# Patient Record
Sex: FEMALE | Race: WHITE | NOT HISPANIC OR LATINO | URBAN - METROPOLITAN AREA
[De-identification: names, ages, dates, MRNs, and addresses within clinical notes are randomized per-mention and may not be internally consistent; named-entity substitution may affect disease eponyms.]

---

## 2017-04-03 ENCOUNTER — APPOINTMENT (RX ONLY)
Dept: URBAN - METROPOLITAN AREA CLINIC 349 | Facility: CLINIC | Age: 77
Setting detail: DERMATOLOGY
End: 2017-04-03

## 2017-04-03 DIAGNOSIS — L81.4 OTHER MELANIN HYPERPIGMENTATION: ICD-10-CM

## 2017-04-03 DIAGNOSIS — D18.0 HEMANGIOMA: ICD-10-CM

## 2017-04-03 DIAGNOSIS — Z85.820 PERSONAL HISTORY OF MALIGNANT MELANOMA OF SKIN: ICD-10-CM

## 2017-04-03 DIAGNOSIS — L57.8 OTHER SKIN CHANGES DUE TO CHRONIC EXPOSURE TO NONIONIZING RADIATION: ICD-10-CM

## 2017-04-03 DIAGNOSIS — L21.8 OTHER SEBORRHEIC DERMATITIS: ICD-10-CM

## 2017-04-03 PROBLEM — M12.9 ARTHROPATHY, UNSPECIFIED: Status: ACTIVE | Noted: 2017-04-03

## 2017-04-03 PROBLEM — I10 ESSENTIAL (PRIMARY) HYPERTENSION: Status: ACTIVE | Noted: 2017-04-03

## 2017-04-03 PROBLEM — D18.01 HEMANGIOMA OF SKIN AND SUBCUTANEOUS TISSUE: Status: ACTIVE | Noted: 2017-04-03

## 2017-04-03 PROCEDURE — 99214 OFFICE O/P EST MOD 30 MIN: CPT

## 2017-04-03 PROCEDURE — ? RECOMMENDATIONS

## 2017-04-03 PROCEDURE — ? COUNSELING

## 2017-04-03 PROCEDURE — ? PRESCRIPTION

## 2017-04-03 RX ORDER — MOMETASONE FUROATE 1 MG/G
CREAM TOPICAL
Qty: 1 | Refills: 2 | Status: ERX | COMMUNITY
Start: 2017-04-03

## 2017-04-03 RX ADMIN — MOMETASONE FUROATE: 1 CREAM TOPICAL at 00:00

## 2017-04-03 ASSESSMENT — LOCATION SIMPLE DESCRIPTION DERM
LOCATION SIMPLE: TRAPEZIAL NECK
LOCATION SIMPLE: LEFT FOREARM
LOCATION SIMPLE: ABDOMEN
LOCATION SIMPLE: LEFT UPPER BACK
LOCATION SIMPLE: LEFT PRETIBIAL REGION
LOCATION SIMPLE: RIGHT PRETIBIAL REGION
LOCATION SIMPLE: LEFT CHEEK
LOCATION SIMPLE: CHEST
LOCATION SIMPLE: LEFT ANTERIOR NECK
LOCATION SIMPLE: RIGHT FOREARM
LOCATION SIMPLE: UPPER BACK

## 2017-04-03 ASSESSMENT — LOCATION DETAILED DESCRIPTION DERM
LOCATION DETAILED: LEFT CENTRAL MALAR CHEEK
LOCATION DETAILED: LEFT PROXIMAL DORSAL FOREARM
LOCATION DETAILED: SUPERIOR THORACIC SPINE
LOCATION DETAILED: LEFT INFERIOR MEDIAL UPPER BACK
LOCATION DETAILED: RIGHT PROXIMAL DORSAL FOREARM
LOCATION DETAILED: RIGHT DISTAL PRETIBIAL REGION
LOCATION DETAILED: LEFT MEDIAL UPPER BACK
LOCATION DETAILED: MID TRAPEZIAL NECK
LOCATION DETAILED: LEFT INFERIOR ANTERIOR NECK
LOCATION DETAILED: RIGHT MEDIAL SUPERIOR CHEST
LOCATION DETAILED: EPIGASTRIC SKIN
LOCATION DETAILED: RIGHT VENTRAL DISTAL FOREARM
LOCATION DETAILED: LEFT VENTRAL DISTAL FOREARM
LOCATION DETAILED: LEFT PROXIMAL PRETIBIAL REGION

## 2017-04-03 ASSESSMENT — LOCATION ZONE DERM
LOCATION ZONE: FACE
LOCATION ZONE: LEG
LOCATION ZONE: TRUNK
LOCATION ZONE: NECK
LOCATION ZONE: ARM

## 2017-04-03 NOTE — PROCEDURE: COUNSELING
Detail Level: Detailed
Quality 137: Melanoma: Continuity Of Care - Recall System: Recall system not utilized, reason not otherwise specified
Quality 224: Stage 0-Iic Melanoma: Overutilization Of Imaging Studies For Only Stage 0-Iic Melanoma: None of the following diagnostic imaging studies ordered: chest X-ray, CT, Ultrasound, MRI, PET, or nuclear medicine scans (ML)
Detail Level: Generalized
Detail Level: Zone

## 2017-10-04 ENCOUNTER — APPOINTMENT (RX ONLY)
Dept: URBAN - METROPOLITAN AREA CLINIC 349 | Facility: CLINIC | Age: 77
Setting detail: DERMATOLOGY
End: 2017-10-04

## 2017-10-04 DIAGNOSIS — L81.4 OTHER MELANIN HYPERPIGMENTATION: ICD-10-CM

## 2017-10-04 DIAGNOSIS — L82.1 OTHER SEBORRHEIC KERATOSIS: ICD-10-CM

## 2017-10-04 DIAGNOSIS — Z85.820 PERSONAL HISTORY OF MALIGNANT MELANOMA OF SKIN: ICD-10-CM

## 2017-10-04 DIAGNOSIS — Z85.828 PERSONAL HISTORY OF OTHER MALIGNANT NEOPLASM OF SKIN: ICD-10-CM

## 2017-10-04 DIAGNOSIS — L57.8 OTHER SKIN CHANGES DUE TO CHRONIC EXPOSURE TO NONIONIZING RADIATION: ICD-10-CM

## 2017-10-04 DIAGNOSIS — L57.0 ACTINIC KERATOSIS: ICD-10-CM

## 2017-10-04 PROBLEM — L23.7 ALLERGIC CONTACT DERMATITIS DUE TO PLANTS, EXCEPT FOOD: Status: ACTIVE | Noted: 2017-10-04

## 2017-10-04 PROBLEM — E05.90 THYROTOXICOSIS, UNSPECIFIED WITHOUT THYROTOXIC CRISIS OR STORM: Status: ACTIVE | Noted: 2017-10-04

## 2017-10-04 PROBLEM — F41.9 ANXIETY DISORDER, UNSPECIFIED: Status: ACTIVE | Noted: 2017-10-04

## 2017-10-04 PROBLEM — M12.9 ARTHROPATHY, UNSPECIFIED: Status: ACTIVE | Noted: 2017-10-04

## 2017-10-04 PROBLEM — L55.1 SUNBURN OF SECOND DEGREE: Status: ACTIVE | Noted: 2017-10-04

## 2017-10-04 PROBLEM — K75.9 INFLAMMATORY LIVER DISEASE, UNSPECIFIED: Status: ACTIVE | Noted: 2017-10-04

## 2017-10-04 PROBLEM — L20.84 INTRINSIC (ALLERGIC) ECZEMA: Status: ACTIVE | Noted: 2017-10-04

## 2017-10-04 PROCEDURE — 99213 OFFICE O/P EST LOW 20 MIN: CPT | Mod: 25

## 2017-10-04 PROCEDURE — ? LIQUID NITROGEN

## 2017-10-04 PROCEDURE — ? COUNSELING

## 2017-10-04 PROCEDURE — 17000 DESTRUCT PREMALG LESION: CPT

## 2017-10-04 ASSESSMENT — LOCATION SIMPLE DESCRIPTION DERM
LOCATION SIMPLE: CHIN
LOCATION SIMPLE: CHEST
LOCATION SIMPLE: LEFT BREAST
LOCATION SIMPLE: LEFT UPPER BACK
LOCATION SIMPLE: RIGHT LOWER BACK
LOCATION SIMPLE: NOSE
LOCATION SIMPLE: LEFT FOREARM
LOCATION SIMPLE: INFERIOR FOREHEAD
LOCATION SIMPLE: RIGHT FOREARM
LOCATION SIMPLE: LEFT HAND
LOCATION SIMPLE: RIGHT HAND
LOCATION SIMPLE: RIGHT SHOULDER
LOCATION SIMPLE: LEFT SHOULDER
LOCATION SIMPLE: LEFT CHEEK
LOCATION SIMPLE: RIGHT CHEEK

## 2017-10-04 ASSESSMENT — LOCATION DETAILED DESCRIPTION DERM
LOCATION DETAILED: LEFT MENTAL CREASE
LOCATION DETAILED: RIGHT CENTRAL MALAR CHEEK
LOCATION DETAILED: RIGHT POSTERIOR SHOULDER
LOCATION DETAILED: RIGHT SUPERIOR MEDIAL MIDBACK
LOCATION DETAILED: LEFT PROXIMAL DORSAL FOREARM
LOCATION DETAILED: LEFT MEDIAL BREAST 10-11:00 REGION
LOCATION DETAILED: RIGHT RADIAL DORSAL HAND
LOCATION DETAILED: INFERIOR MID FOREHEAD
LOCATION DETAILED: RIGHT ANTERIOR SHOULDER
LOCATION DETAILED: LEFT POSTERIOR SHOULDER
LOCATION DETAILED: UPPER STERNUM
LOCATION DETAILED: NASAL DORSUM
LOCATION DETAILED: LEFT INFERIOR CENTRAL MALAR CHEEK
LOCATION DETAILED: LEFT SUPERIOR UPPER BACK
LOCATION DETAILED: LEFT ULNAR DORSAL HAND
LOCATION DETAILED: RIGHT PROXIMAL DORSAL FOREARM

## 2017-10-04 ASSESSMENT — LOCATION ZONE DERM
LOCATION ZONE: HAND
LOCATION ZONE: TRUNK
LOCATION ZONE: ARM
LOCATION ZONE: FACE
LOCATION ZONE: NOSE

## 2017-10-04 ASSESSMENT — PAIN INTENSITY VAS: HOW INTENSE IS YOUR PAIN 0 BEING NO PAIN, 10 BEING THE MOST SEVERE PAIN POSSIBLE?: NO PAIN

## 2017-10-04 NOTE — PROCEDURE: REASSURANCE
Quality 137: Melanoma: Continuity Of Care - Recall System: Recall system not utilized, reason not otherwise specified
Quality 224: Stage 0-Iic Melanoma: Overutilization Of Imaging Studies For Only Stage 0-Iic Melanoma: None of the following diagnostic imaging studies ordered: chest X-ray, CT, Ultrasound, MRI, PET, or nuclear medicine scans (ML)
Detail Level: Zone
Detail Level: Detailed

## 2017-10-04 NOTE — PROCEDURE: LIQUID NITROGEN
Consent: The patient's consent was obtained including but not limited to risks of crusting, scabbing, blistering, scarring, darker or lighter pigmentary change, recurrence, incomplete removal and infection.
Duration Of Freeze Thaw-Cycle (Seconds): 2
Post-Care Instructions: I reviewed with the patient in detail post-care instructions. Patient is to wear sunprotection, and avoid picking at any of the treated lesions. Pt may apply Vaseline to crusted or scabbing areas.
Render Post-Care Instructions In Note?: no
Number Of Freeze-Thaw Cycles: 1 freeze-thaw cycle
Detail Level: Detailed

## 2018-02-07 ENCOUNTER — APPOINTMENT (RX ONLY)
Dept: URBAN - METROPOLITAN AREA CLINIC 349 | Facility: CLINIC | Age: 78
Setting detail: DERMATOLOGY
End: 2018-02-07

## 2018-02-07 DIAGNOSIS — L259 CONTACT DERMATITIS AND OTHER ECZEMA, UNSPECIFIED CAUSE: ICD-10-CM

## 2018-02-07 DIAGNOSIS — L82.1 OTHER SEBORRHEIC KERATOSIS: ICD-10-CM

## 2018-02-07 PROBLEM — K75.9 INFLAMMATORY LIVER DISEASE, UNSPECIFIED: Status: ACTIVE | Noted: 2018-02-07

## 2018-02-07 PROBLEM — I10 ESSENTIAL (PRIMARY) HYPERTENSION: Status: ACTIVE | Noted: 2018-02-07

## 2018-02-07 PROBLEM — L23.7 ALLERGIC CONTACT DERMATITIS DUE TO PLANTS, EXCEPT FOOD: Status: ACTIVE | Noted: 2018-02-07

## 2018-02-07 PROBLEM — L23.9 ALLERGIC CONTACT DERMATITIS, UNSPECIFIED CAUSE: Status: ACTIVE | Noted: 2018-02-07

## 2018-02-07 PROBLEM — F41.9 ANXIETY DISORDER, UNSPECIFIED: Status: ACTIVE | Noted: 2018-02-07

## 2018-02-07 PROBLEM — L55.1 SUNBURN OF SECOND DEGREE: Status: ACTIVE | Noted: 2018-02-07

## 2018-02-07 PROCEDURE — ? TREATMENT REGIMEN

## 2018-02-07 PROCEDURE — ? OTHER

## 2018-02-07 PROCEDURE — ? INTRAMUSCULAR KENALOG

## 2018-02-07 PROCEDURE — ? COUNSELING

## 2018-02-07 PROCEDURE — ? PRESCRIPTION

## 2018-02-07 PROCEDURE — 96372 THER/PROPH/DIAG INJ SC/IM: CPT

## 2018-02-07 PROCEDURE — 99213 OFFICE O/P EST LOW 20 MIN: CPT | Mod: 25

## 2018-02-07 RX ORDER — FLUTICASONE PROPIONATE 0.05 MG/G
OINTMENT TOPICAL
Qty: 1 | Refills: 2 | Status: ERX | COMMUNITY
Start: 2018-02-07

## 2018-02-07 RX ADMIN — FLUTICASONE PROPIONATE: 0.05 OINTMENT TOPICAL at 00:00

## 2018-02-07 ASSESSMENT — LOCATION DETAILED DESCRIPTION DERM
LOCATION DETAILED: LEFT BUTTOCK
LOCATION DETAILED: RIGHT ANTERIOR DISTAL UPPER ARM
LOCATION DETAILED: LEFT ANTERIOR DISTAL UPPER ARM
LOCATION DETAILED: RIGHT PROXIMAL RADIAL DORSAL FOREARM

## 2018-02-07 ASSESSMENT — LOCATION ZONE DERM
LOCATION ZONE: TRUNK
LOCATION ZONE: ARM

## 2018-02-07 ASSESSMENT — LOCATION SIMPLE DESCRIPTION DERM
LOCATION SIMPLE: LEFT BUTTOCK
LOCATION SIMPLE: RIGHT UPPER ARM
LOCATION SIMPLE: RIGHT FOREARM
LOCATION SIMPLE: LEFT UPPER ARM

## 2018-02-07 NOTE — PROCEDURE: TREATMENT REGIMEN
Samples Given: Dove for sensitive skin
Detail Level: Zone
Initiate Treatment: Fluticasone twice daily

## 2018-02-07 NOTE — PROCEDURE: OTHER
Other (Free Text): Pt had surgery Monday and is currently in a sling\\nPt has a history of prednisone use
Note Text (......Xxx Chief Complaint.): This diagnosis correlates with the
Detail Level: Zone

## 2018-03-12 ENCOUNTER — APPOINTMENT (RX ONLY)
Dept: URBAN - METROPOLITAN AREA CLINIC 349 | Facility: CLINIC | Age: 78
Setting detail: DERMATOLOGY
End: 2018-03-12

## 2018-03-12 DIAGNOSIS — L259 CONTACT DERMATITIS AND OTHER ECZEMA, UNSPECIFIED CAUSE: ICD-10-CM | Status: RESOLVED

## 2018-03-12 DIAGNOSIS — L57.0 ACTINIC KERATOSIS: ICD-10-CM

## 2018-03-12 PROBLEM — K75.9 INFLAMMATORY LIVER DISEASE, UNSPECIFIED: Status: ACTIVE | Noted: 2018-03-12

## 2018-03-12 PROBLEM — L23.9 ALLERGIC CONTACT DERMATITIS, UNSPECIFIED CAUSE: Status: ACTIVE | Noted: 2018-03-12

## 2018-03-12 PROCEDURE — ? LIQUID NITROGEN

## 2018-03-12 PROCEDURE — ? OTHER

## 2018-03-12 PROCEDURE — 99213 OFFICE O/P EST LOW 20 MIN: CPT | Mod: 25

## 2018-03-12 PROCEDURE — 17000 DESTRUCT PREMALG LESION: CPT

## 2018-03-12 PROCEDURE — ? TREATMENT REGIMEN

## 2018-03-12 PROCEDURE — 17003 DESTRUCT PREMALG LES 2-14: CPT

## 2018-03-12 PROCEDURE — ? COUNSELING

## 2018-03-12 ASSESSMENT — LOCATION DETAILED DESCRIPTION DERM
LOCATION DETAILED: LEFT SUPERIOR MEDIAL FOREHEAD
LOCATION DETAILED: RIGHT PROXIMAL RADIAL DORSAL FOREARM
LOCATION DETAILED: SUPERIOR MID FOREHEAD

## 2018-03-12 ASSESSMENT — LOCATION SIMPLE DESCRIPTION DERM
LOCATION SIMPLE: RIGHT FOREARM
LOCATION SIMPLE: LEFT FOREHEAD
LOCATION SIMPLE: SUPERIOR FOREHEAD

## 2018-03-12 ASSESSMENT — LOCATION ZONE DERM
LOCATION ZONE: FACE
LOCATION ZONE: ARM

## 2018-03-12 NOTE — PROCEDURE: TREATMENT REGIMEN
Samples Given: Aveeno eczema balm
Plan: May use fluticasone cream if needed in future for flares.
Detail Level: Zone

## 2018-03-12 NOTE — PROCEDURE: OTHER
Detail Level: Zone
Note Text (......Xxx Chief Complaint.): This diagnosis correlates with the
Other (Free Text): Pt has a history of allergy to hydrocortisone butyrate\\n\\nLikely came into contact with something during surgery \\n\\nPatient has had patch test in the past.

## 2018-03-12 NOTE — PROCEDURE: LIQUID NITROGEN
Duration Of Freeze Thaw-Cycle (Seconds): 3
Consent: The patient's consent was obtained including but not limited to risks of crusting, scabbing, blistering, scarring, darker or lighter pigmentary change, recurrence, incomplete removal and infection.
Detail Level: Detailed
Post-Care Instructions: I reviewed with the patient in detail post-care instructions. Patient is to wear sunprotection, and avoid picking at any of the treated lesions. Pt may apply Vaseline to crusted or scabbing areas.
Render Post-Care Instructions In Note?: no
Number Of Freeze-Thaw Cycles: 2 freeze-thaw cycles

## 2018-10-03 ENCOUNTER — APPOINTMENT (RX ONLY)
Dept: URBAN - METROPOLITAN AREA CLINIC 349 | Facility: CLINIC | Age: 78
Setting detail: DERMATOLOGY
End: 2018-10-03

## 2018-10-03 DIAGNOSIS — L81.4 OTHER MELANIN HYPERPIGMENTATION: ICD-10-CM

## 2018-10-03 DIAGNOSIS — L82.1 OTHER SEBORRHEIC KERATOSIS: ICD-10-CM

## 2018-10-03 DIAGNOSIS — Z85.828 PERSONAL HISTORY OF OTHER MALIGNANT NEOPLASM OF SKIN: ICD-10-CM

## 2018-10-03 DIAGNOSIS — L57.8 OTHER SKIN CHANGES DUE TO CHRONIC EXPOSURE TO NONIONIZING RADIATION: ICD-10-CM

## 2018-10-03 DIAGNOSIS — Z71.89 OTHER SPECIFIED COUNSELING: ICD-10-CM

## 2018-10-03 DIAGNOSIS — Z85.820 PERSONAL HISTORY OF MALIGNANT MELANOMA OF SKIN: ICD-10-CM

## 2018-10-03 PROBLEM — L55.1 SUNBURN OF SECOND DEGREE: Status: ACTIVE | Noted: 2018-10-03

## 2018-10-03 PROBLEM — L20.84 INTRINSIC (ALLERGIC) ECZEMA: Status: ACTIVE | Noted: 2018-10-03

## 2018-10-03 PROCEDURE — 99213 OFFICE O/P EST LOW 20 MIN: CPT

## 2018-10-03 PROCEDURE — ? COUNSELING

## 2018-10-03 ASSESSMENT — LOCATION DETAILED DESCRIPTION DERM
LOCATION DETAILED: RIGHT DISTAL POSTERIOR UPPER ARM
LOCATION DETAILED: LEFT DISTAL POSTERIOR UPPER ARM
LOCATION DETAILED: RIGHT SUPERIOR LATERAL UPPER BACK
LOCATION DETAILED: RIGHT ANTERIOR DISTAL THIGH
LOCATION DETAILED: UPPER STERNUM
LOCATION DETAILED: LEFT ANTERIOR DISTAL THIGH
LOCATION DETAILED: LEFT MEDIAL UPPER BACK
LOCATION DETAILED: LEFT SUPERIOR MEDIAL UPPER BACK
LOCATION DETAILED: RIGHT MEDIAL SUPERIOR CHEST
LOCATION DETAILED: LEFT SUPERIOR UPPER BACK

## 2018-10-03 ASSESSMENT — LOCATION SIMPLE DESCRIPTION DERM
LOCATION SIMPLE: RIGHT THIGH
LOCATION SIMPLE: LEFT UPPER BACK
LOCATION SIMPLE: RIGHT BACK
LOCATION SIMPLE: CHEST
LOCATION SIMPLE: LEFT POSTERIOR UPPER ARM
LOCATION SIMPLE: RIGHT POSTERIOR UPPER ARM
LOCATION SIMPLE: LEFT THIGH

## 2018-10-03 ASSESSMENT — LOCATION ZONE DERM
LOCATION ZONE: LEG
LOCATION ZONE: ARM
LOCATION ZONE: TRUNK

## 2018-10-25 ENCOUNTER — APPOINTMENT (RX ONLY)
Dept: URBAN - METROPOLITAN AREA CLINIC 349 | Facility: CLINIC | Age: 78
Setting detail: DERMATOLOGY
End: 2018-10-25

## 2018-10-25 DIAGNOSIS — L29.89 OTHER PRURITUS: ICD-10-CM

## 2018-10-25 DIAGNOSIS — L29.8 OTHER PRURITUS: ICD-10-CM

## 2018-10-25 DIAGNOSIS — L30.9 DERMATITIS, UNSPECIFIED: ICD-10-CM

## 2018-10-25 PROBLEM — I10 ESSENTIAL (PRIMARY) HYPERTENSION: Status: ACTIVE | Noted: 2018-10-25

## 2018-10-25 PROBLEM — L20.84 INTRINSIC (ALLERGIC) ECZEMA: Status: ACTIVE | Noted: 2018-10-25

## 2018-10-25 PROCEDURE — 96372 THER/PROPH/DIAG INJ SC/IM: CPT

## 2018-10-25 PROCEDURE — ? COUNSELING

## 2018-10-25 PROCEDURE — ? RECOMMENDATIONS

## 2018-10-25 PROCEDURE — ? PRESCRIPTION

## 2018-10-25 PROCEDURE — ? OTHER

## 2018-10-25 PROCEDURE — ? INTRAMUSCULAR KENALOG

## 2018-10-25 PROCEDURE — 99213 OFFICE O/P EST LOW 20 MIN: CPT | Mod: 25

## 2018-10-25 RX ORDER — TRIAMCINOLONE ACETONIDE 1 MG/G
CREAM TOPICAL
Qty: 1 | Refills: 1 | Status: ERX | COMMUNITY
Start: 2018-10-25

## 2018-10-25 RX ADMIN — TRIAMCINOLONE ACETONIDE: 1 CREAM TOPICAL at 00:00

## 2018-10-25 ASSESSMENT — LOCATION ZONE DERM
LOCATION ZONE: TRUNK
LOCATION ZONE: ARM

## 2018-10-25 ASSESSMENT — LOCATION SIMPLE DESCRIPTION DERM
LOCATION SIMPLE: ABDOMEN
LOCATION SIMPLE: UPPER BACK
LOCATION SIMPLE: CHEST
LOCATION SIMPLE: RIGHT LOWER BACK
LOCATION SIMPLE: LEFT UPPER ARM
LOCATION SIMPLE: RIGHT UPPER ARM
LOCATION SIMPLE: LEFT LOWER BACK

## 2018-10-25 ASSESSMENT — LOCATION DETAILED DESCRIPTION DERM
LOCATION DETAILED: SUPERIOR THORACIC SPINE
LOCATION DETAILED: PERIUMBILICAL SKIN
LOCATION DETAILED: RIGHT ANTERIOR PROXIMAL UPPER ARM
LOCATION DETAILED: LEFT SUPERIOR MEDIAL LOWER BACK
LOCATION DETAILED: LEFT ANTERIOR DISTAL UPPER ARM
LOCATION DETAILED: RIGHT SUPERIOR LATERAL LOWER BACK
LOCATION DETAILED: MIDDLE STERNUM

## 2018-10-25 NOTE — HPI: RASH
What Type Of Note Output Would You Prefer (Optional)?: Bullet Format
How Severe Is Your Rash?: moderate
Is This A New Presentation, Or A Follow-Up?: Rash
Additional History: Did wear a new night gown without washing.  Itchy prickly sensation   Had CT scan Monday with contrast

## 2018-10-25 NOTE — PROCEDURE: OTHER
Other (Free Text): Treatment for rash should help
Note Text (......Xxx Chief Complaint.): This diagnosis correlates with the
Other (Free Text): Denies chest tightness or trouble breathing. She was patch tested in past and said she has sensitivity to many things. She should go to ER if she develops issues with breathing.
Detail Level: Zone

## 2018-11-15 ENCOUNTER — APPOINTMENT (RX ONLY)
Dept: URBAN - METROPOLITAN AREA CLINIC 349 | Facility: CLINIC | Age: 78
Setting detail: DERMATOLOGY
End: 2018-11-15

## 2018-11-15 DIAGNOSIS — L30.9 DERMATITIS, UNSPECIFIED: ICD-10-CM | Status: IMPROVED

## 2018-11-15 PROCEDURE — ? TREATMENT REGIMEN

## 2018-11-15 PROCEDURE — 99213 OFFICE O/P EST LOW 20 MIN: CPT

## 2018-11-15 PROCEDURE — ? COUNSELING

## 2018-11-15 ASSESSMENT — LOCATION ZONE DERM
LOCATION ZONE: ARM
LOCATION ZONE: TRUNK

## 2018-11-15 ASSESSMENT — LOCATION DETAILED DESCRIPTION DERM
LOCATION DETAILED: LEFT ELBOW
LOCATION DETAILED: RIGHT ANTECUBITAL SKIN
LOCATION DETAILED: RIGHT ANTERIOR SHOULDER
LOCATION DETAILED: RIGHT DISTAL LATERAL POSTERIOR UPPER ARM
LOCATION DETAILED: LEFT LATERAL SUPERIOR CHEST
LOCATION DETAILED: EPIGASTRIC SKIN
LOCATION DETAILED: LEFT INFERIOR MEDIAL UPPER BACK
LOCATION DETAILED: LEFT DISTAL DORSAL FOREARM
LOCATION DETAILED: RIGHT ANTERIOR DISTAL UPPER ARM
LOCATION DETAILED: LEFT ANTERIOR DISTAL UPPER ARM
LOCATION DETAILED: RIGHT DISTAL RADIAL DORSAL FOREARM

## 2018-11-15 ASSESSMENT — LOCATION SIMPLE DESCRIPTION DERM
LOCATION SIMPLE: LEFT ELBOW
LOCATION SIMPLE: RIGHT POSTERIOR UPPER ARM
LOCATION SIMPLE: LEFT UPPER BACK
LOCATION SIMPLE: LEFT UPPER ARM
LOCATION SIMPLE: RIGHT SHOULDER
LOCATION SIMPLE: ABDOMEN
LOCATION SIMPLE: CHEST
LOCATION SIMPLE: RIGHT UPPER ARM
LOCATION SIMPLE: RIGHT FOREARM
LOCATION SIMPLE: LEFT FOREARM

## 2019-07-01 ENCOUNTER — APPOINTMENT (RX ONLY)
Dept: URBAN - METROPOLITAN AREA CLINIC 349 | Facility: CLINIC | Age: 79
Setting detail: DERMATOLOGY
End: 2019-07-01

## 2019-07-01 DIAGNOSIS — L57.0 ACTINIC KERATOSIS: ICD-10-CM

## 2019-07-01 PROBLEM — I10 ESSENTIAL (PRIMARY) HYPERTENSION: Status: ACTIVE | Noted: 2019-07-01

## 2019-07-01 PROCEDURE — 99213 OFFICE O/P EST LOW 20 MIN: CPT

## 2019-07-01 PROCEDURE — ? DEFER

## 2019-07-01 PROCEDURE — ? OTHER

## 2019-07-01 PROCEDURE — ? COUNSELING

## 2019-07-01 ASSESSMENT — LOCATION SIMPLE DESCRIPTION DERM
LOCATION SIMPLE: RIGHT FOREHEAD
LOCATION SIMPLE: LEFT FOREHEAD

## 2019-07-01 ASSESSMENT — LOCATION DETAILED DESCRIPTION DERM
LOCATION DETAILED: LEFT MEDIAL FOREHEAD
LOCATION DETAILED: RIGHT FOREHEAD
LOCATION DETAILED: RIGHT MEDIAL FOREHEAD

## 2019-07-01 ASSESSMENT — LOCATION ZONE DERM: LOCATION ZONE: FACE

## 2019-07-01 NOTE — HPI: RASH
How Severe Is Your Rash?: mild
Is This A New Presentation, Or A Follow-Up?: Rash
Additional History: Patient has a rash on her forehead that she states is not going sway with her topical. She is currently using Mometasone once daily. Patient states she has had this rash for several months. Patient states she is mostly flaring on her face. Patient denies medication changes or any changes in household products.

## 2019-07-01 NOTE — PROCEDURE: OTHER
Note Text (......Xxx Chief Complaint.): This diagnosis correlates with the
Detail Level: Zone
Other (Free Text): Patient has a rash on her forehead that she states is not going sway with her topical steroid. She is currently using Mometasone once daily. Patient states she has had this rash for several months. Patient states she is mostly flaring on her face. Patient denies medication changes or any changes in household products. \\n\\nClaire discussed discontinuing topical to see if there is any improvement. Patient has been using Mometasone on her face and it appears to be irritated today. Anaya discussed that the area looks like it could be an actinic keratosis and its being masked by topical steroid. Anaya discussed light treatment and topical chemo creams. Anaya stated she thinks the patient would react well to the light treatment. Anaya discussed the risks and side effects. \\n\\nPaient has had area treated with LN2 in the past.

## 2019-07-01 NOTE — PROCEDURE: DEFER
Detail Level: Detailed
Scheduling Instructions (Optional): light treatment
Introduction Text (Please End With A Colon): Procedure to be performed: Shave removal

## 2019-07-23 ENCOUNTER — APPOINTMENT (RX ONLY)
Dept: URBAN - METROPOLITAN AREA CLINIC 349 | Facility: CLINIC | Age: 79
Setting detail: DERMATOLOGY
End: 2019-07-23

## 2019-07-23 DIAGNOSIS — L57.0 ACTINIC KERATOSIS: ICD-10-CM

## 2019-07-23 PROCEDURE — 96567 PDT DSTR PRMLG LES SKN: CPT

## 2019-07-23 PROCEDURE — ? COUNSELING

## 2019-07-23 PROCEDURE — ? PDT: BLUE

## 2019-07-23 ASSESSMENT — LOCATION DETAILED DESCRIPTION DERM
LOCATION DETAILED: RIGHT INFERIOR LATERAL FOREHEAD
LOCATION DETAILED: RIGHT SUPERIOR MEDIAL FOREHEAD
LOCATION DETAILED: LEFT INFERIOR LATERAL FOREHEAD
LOCATION DETAILED: LEFT MEDIAL FOREHEAD
LOCATION DETAILED: RIGHT MEDIAL FOREHEAD
LOCATION DETAILED: NASAL DORSUM
LOCATION DETAILED: RIGHT FOREHEAD

## 2019-07-23 ASSESSMENT — LOCATION SIMPLE DESCRIPTION DERM
LOCATION SIMPLE: NOSE
LOCATION SIMPLE: RIGHT FOREHEAD
LOCATION SIMPLE: LEFT FOREHEAD

## 2019-07-23 ASSESSMENT — LOCATION ZONE DERM
LOCATION ZONE: NOSE
LOCATION ZONE: FACE

## 2019-07-23 NOTE — PROCEDURE: PDT: BLUE
Was Levulan/Ameluz Applied On A Previous Day?: No
Debridement Text (Will Only Render In Visit Note If You Select Debridement Option Under Who Performed The Pdt Field): Prior to application of the photodynamic medication the hyperkeratotic lesions were curetted to make them more amenable to therapy.
Show Medical Necessity In Plan?: Yes
Incubation Time: 01:00 hr
Number Of Kerasticks/Tubes Billed For: 1
Detail Level: Zone
Medical Necessity: Precancerous Lesions
Who Performed The Pdt?: Performed by Nurse, MA or Aesthetician ([id number])
Who Performed The Pdt (Staff): Josseline Red CMA
Illumination Time: 00:16:40
Which Photosensitizer Was Used: Levulan
Total Number Of Aks Treated (Optional To Report): 0
Consent: Written consent obtained.  The risks were reviewed with the patient including but not limited to: pigmentary changes, pain, blistering, scabbing, redness, and the remote possibility of scarring.
Anesthesia Type: 1% lidocaine with epinephrine
Pre-Procedure Text: The treatment areas were cleaned and prepped in the usual fashion.
Light Source: Demar-U
Post-Care Instructions: I reviewed with the patient in detail post-care instructions. Patient is to avoid sunlight for the next 2 days, and wear sun protection. Patients may expect sunburn like redness, discomfort and scabbing.

## 2019-08-26 ENCOUNTER — APPOINTMENT (RX ONLY)
Dept: URBAN - METROPOLITAN AREA CLINIC 349 | Facility: CLINIC | Age: 79
Setting detail: DERMATOLOGY
End: 2019-08-26

## 2019-08-26 DIAGNOSIS — L57.0 ACTINIC KERATOSIS: ICD-10-CM | Status: IMPROVED

## 2019-08-26 DIAGNOSIS — F42.4 EXCORIATION (SKIN-PICKING) DISORDER: ICD-10-CM

## 2019-08-26 DIAGNOSIS — L82.1 OTHER SEBORRHEIC KERATOSIS: ICD-10-CM

## 2019-08-26 PROBLEM — F41.9 ANXIETY DISORDER, UNSPECIFIED: Status: ACTIVE | Noted: 2019-08-26

## 2019-08-26 PROBLEM — S10.80XA UNSPECIFIED SUPERFICIAL INJURY OF OTHER SPECIFIED PART OF NECK, INITIAL ENCOUNTER: Status: ACTIVE | Noted: 2019-08-26

## 2019-08-26 PROBLEM — K75.9 INFLAMMATORY LIVER DISEASE, UNSPECIFIED: Status: ACTIVE | Noted: 2019-08-26

## 2019-08-26 PROCEDURE — ? OTHER

## 2019-08-26 PROCEDURE — ? LIQUID NITROGEN

## 2019-08-26 PROCEDURE — ? COUNSELING

## 2019-08-26 PROCEDURE — 99213 OFFICE O/P EST LOW 20 MIN: CPT | Mod: 25

## 2019-08-26 PROCEDURE — 17000 DESTRUCT PREMALG LESION: CPT

## 2019-08-26 ASSESSMENT — LOCATION SIMPLE DESCRIPTION DERM
LOCATION SIMPLE: RIGHT FOREHEAD
LOCATION SIMPLE: LEFT CHEEK
LOCATION SIMPLE: POSTERIOR NECK
LOCATION SIMPLE: LEFT FOREHEAD

## 2019-08-26 ASSESSMENT — LOCATION DETAILED DESCRIPTION DERM
LOCATION DETAILED: RIGHT MEDIAL FOREHEAD
LOCATION DETAILED: RIGHT FOREHEAD
LOCATION DETAILED: LEFT LATERAL SUBMANDIBULAR CHEEK
LOCATION DETAILED: MID POSTERIOR NECK
LOCATION DETAILED: RIGHT LATERAL TRAPEZIAL NECK
LOCATION DETAILED: LEFT MEDIAL FOREHEAD

## 2019-08-26 ASSESSMENT — LOCATION ZONE DERM
LOCATION ZONE: FACE
LOCATION ZONE: NECK

## 2019-08-26 NOTE — PROCEDURE: OTHER
Note Text (......Xxx Chief Complaint.): This diagnosis correlates with the
Other (Free Text): Pt has PDT preformed at previous visit. Pt is very pleased with the results. Lesions on forehead and cheeks have resolved. Pt has one still present near jawline. Treated today with LN2.
Other (Free Text): Pt stated that she has been scratching and picking at spots on back and neck
Detail Level: Zone

## 2019-09-26 ENCOUNTER — APPOINTMENT (RX ONLY)
Dept: URBAN - METROPOLITAN AREA CLINIC 349 | Facility: CLINIC | Age: 79
Setting detail: DERMATOLOGY
End: 2019-09-26

## 2019-09-26 DIAGNOSIS — L30.9 DERMATITIS, UNSPECIFIED: ICD-10-CM

## 2019-09-26 PROBLEM — M12.9 ARTHROPATHY, UNSPECIFIED: Status: ACTIVE | Noted: 2019-09-26

## 2019-09-26 PROBLEM — E05.90 THYROTOXICOSIS, UNSPECIFIED WITHOUT THYROTOXIC CRISIS OR STORM: Status: ACTIVE | Noted: 2019-09-26

## 2019-09-26 PROCEDURE — ? COUNSELING

## 2019-09-26 PROCEDURE — ? ORDER TESTS

## 2019-09-26 PROCEDURE — ? OTHER

## 2019-09-26 PROCEDURE — 99213 OFFICE O/P EST LOW 20 MIN: CPT

## 2019-09-26 PROCEDURE — ? PRESCRIPTION

## 2019-09-26 PROCEDURE — ? TREATMENT REGIMEN

## 2019-09-26 RX ORDER — VALACYCLOVIR 1 G/1
TABLET ORAL
Qty: 21 | Refills: 0 | Status: ERX | COMMUNITY
Start: 2019-09-26

## 2019-09-26 RX ORDER — FLUTICASONE PROPIONATE 0.05 MG/G
OINTMENT TOPICAL
Qty: 1 | Refills: 3 | Status: ERX

## 2019-09-26 RX ADMIN — VALACYCLOVIR: 1 TABLET ORAL at 00:00

## 2019-09-26 ASSESSMENT — LOCATION SIMPLE DESCRIPTION DERM: LOCATION SIMPLE: LEFT ANTERIOR NECK

## 2019-09-26 ASSESSMENT — LOCATION DETAILED DESCRIPTION DERM: LOCATION DETAILED: LEFT INFERIOR LATERAL NECK

## 2019-09-26 ASSESSMENT — LOCATION ZONE DERM: LOCATION ZONE: NECK

## 2019-09-26 NOTE — HPI: RASH
How Severe Is Your Rash?: mild
Is This A New Presentation, Or A Follow-Up?: Rash
Additional History: Patient states about three days ago she noticed a rash on her neck. She states it is very itchy and burns. Patient denies any new products or medications. Patient states she did have a nerve block in her neck and then the rash appeared right after the nerve block. Patient had some Triamcinolone leftover from a previous rash and she has been putting that on the area and that helps with the itching.

## 2019-09-26 NOTE — PROCEDURE: OTHER
Detail Level: Zone
Other (Free Text): Patient states about three days ago she noticed a rash on her neck. She states it is very itchy and burns. Patient denies any new products or medications. Patient states she did have a nerve block in her neck and then the rash appeared right after the nerve block. Patient had some Triamcinolone leftover from a previous rash and she has been putting that on the area and that helps with the itching. \\n\\nWe will do a vital culture to rule out shingles.\\n\\nPatient denies history of kidney problems.
Note Text (......Xxx Chief Complaint.): This diagnosis correlates with the

## 2019-09-26 NOTE — PROCEDURE: TREATMENT REGIMEN
Initiate Treatment: Fluticasone ointment apply to affected area twice daily for two weeks\\nValacyclovir take one pill every 8 hours x7 days
Detail Level: Zone

## 2020-01-29 ENCOUNTER — APPOINTMENT (RX ONLY)
Dept: URBAN - METROPOLITAN AREA CLINIC 349 | Facility: CLINIC | Age: 80
Setting detail: DERMATOLOGY
End: 2020-01-29

## 2020-01-29 DIAGNOSIS — L81.4 OTHER MELANIN HYPERPIGMENTATION: ICD-10-CM

## 2020-01-29 DIAGNOSIS — L57.0 ACTINIC KERATOSIS: ICD-10-CM

## 2020-01-29 DIAGNOSIS — L82.0 INFLAMED SEBORRHEIC KERATOSIS: ICD-10-CM

## 2020-01-29 DIAGNOSIS — Z71.89 OTHER SPECIFIED COUNSELING: ICD-10-CM

## 2020-01-29 DIAGNOSIS — Z85.820 PERSONAL HISTORY OF MALIGNANT MELANOMA OF SKIN: ICD-10-CM

## 2020-01-29 DIAGNOSIS — D22 MELANOCYTIC NEVI: ICD-10-CM

## 2020-01-29 DIAGNOSIS — D18.0 HEMANGIOMA: ICD-10-CM

## 2020-01-29 DIAGNOSIS — L82.1 OTHER SEBORRHEIC KERATOSIS: ICD-10-CM

## 2020-01-29 DIAGNOSIS — Z85.828 PERSONAL HISTORY OF OTHER MALIGNANT NEOPLASM OF SKIN: ICD-10-CM

## 2020-01-29 PROBLEM — C44.629 SQUAMOUS CELL CARCINOMA OF SKIN OF LEFT UPPER LIMB, INCLUDING SHOULDER: Status: ACTIVE | Noted: 2020-01-29

## 2020-01-29 PROBLEM — L55.1 SUNBURN OF SECOND DEGREE: Status: ACTIVE | Noted: 2020-01-29

## 2020-01-29 PROBLEM — E05.90 THYROTOXICOSIS, UNSPECIFIED WITHOUT THYROTOXIC CRISIS OR STORM: Status: ACTIVE | Noted: 2020-01-29

## 2020-01-29 PROBLEM — L20.84 INTRINSIC (ALLERGIC) ECZEMA: Status: ACTIVE | Noted: 2020-01-29

## 2020-01-29 PROBLEM — K75.9 INFLAMMATORY LIVER DISEASE, UNSPECIFIED: Status: ACTIVE | Noted: 2020-01-29

## 2020-01-29 PROBLEM — M12.9 ARTHROPATHY, UNSPECIFIED: Status: ACTIVE | Noted: 2020-01-29

## 2020-01-29 PROBLEM — L23.7 ALLERGIC CONTACT DERMATITIS DUE TO PLANTS, EXCEPT FOOD: Status: ACTIVE | Noted: 2020-01-29

## 2020-01-29 PROBLEM — D22.5 MELANOCYTIC NEVI OF TRUNK: Status: ACTIVE | Noted: 2020-01-29

## 2020-01-29 PROBLEM — D22.62 MELANOCYTIC NEVI OF LEFT UPPER LIMB, INCLUDING SHOULDER: Status: ACTIVE | Noted: 2020-01-29

## 2020-01-29 PROBLEM — D22.39 MELANOCYTIC NEVI OF OTHER PARTS OF FACE: Status: ACTIVE | Noted: 2020-01-29

## 2020-01-29 PROBLEM — D22.61 MELANOCYTIC NEVI OF RIGHT UPPER LIMB, INCLUDING SHOULDER: Status: ACTIVE | Noted: 2020-01-29

## 2020-01-29 PROBLEM — D18.01 HEMANGIOMA OF SKIN AND SUBCUTANEOUS TISSUE: Status: ACTIVE | Noted: 2020-01-29

## 2020-01-29 PROCEDURE — ? COUNSELING

## 2020-01-29 PROCEDURE — 17110 DESTRUCTION B9 LES UP TO 14: CPT

## 2020-01-29 PROCEDURE — ? OTHER

## 2020-01-29 PROCEDURE — A4550 SURGICAL TRAYS: HCPCS

## 2020-01-29 PROCEDURE — ? PATHOLOGY BILLING

## 2020-01-29 PROCEDURE — 99213 OFFICE O/P EST LOW 20 MIN: CPT | Mod: 25

## 2020-01-29 PROCEDURE — ? LIQUID NITROGEN

## 2020-01-29 PROCEDURE — ? BIOPSY BY SHAVE METHOD

## 2020-01-29 PROCEDURE — 11102 TANGNTL BX SKIN SINGLE LES: CPT | Mod: 59

## 2020-01-29 PROCEDURE — 17000 DESTRUCT PREMALG LESION: CPT | Mod: 59

## 2020-01-29 PROCEDURE — 88305 TISSUE EXAM BY PATHOLOGIST: CPT

## 2020-01-29 ASSESSMENT — LOCATION DETAILED DESCRIPTION DERM
LOCATION DETAILED: RIGHT MEDIAL UPPER BACK
LOCATION DETAILED: LEFT PROXIMAL DORSAL FOREARM
LOCATION DETAILED: RIGHT SUPERIOR MEDIAL UPPER BACK
LOCATION DETAILED: RIGHT PROXIMAL POSTERIOR UPPER ARM
LOCATION DETAILED: SUPERIOR MID FOREHEAD
LOCATION DETAILED: INFERIOR THORACIC SPINE
LOCATION DETAILED: LEFT POSTERIOR SHOULDER
LOCATION DETAILED: LEFT MEDIAL FOREHEAD
LOCATION DETAILED: LEFT SUPERIOR UPPER BACK
LOCATION DETAILED: RIGHT PROXIMAL DORSAL FOREARM
LOCATION DETAILED: LEFT LATERAL UPPER BACK
LOCATION DETAILED: RIGHT SUPERIOR UPPER BACK
LOCATION DETAILED: RIGHT SUPERIOR HELIX
LOCATION DETAILED: RIGHT RIB CAGE

## 2020-01-29 ASSESSMENT — LOCATION ZONE DERM
LOCATION ZONE: FACE
LOCATION ZONE: EAR
LOCATION ZONE: TRUNK
LOCATION ZONE: ARM

## 2020-01-29 ASSESSMENT — LOCATION SIMPLE DESCRIPTION DERM
LOCATION SIMPLE: RIGHT POSTERIOR UPPER ARM
LOCATION SIMPLE: SUPERIOR FOREHEAD
LOCATION SIMPLE: LEFT FOREARM
LOCATION SIMPLE: RIGHT FOREARM
LOCATION SIMPLE: ABDOMEN
LOCATION SIMPLE: LEFT UPPER BACK
LOCATION SIMPLE: RIGHT EAR
LOCATION SIMPLE: LEFT FOREHEAD
LOCATION SIMPLE: LEFT SHOULDER
LOCATION SIMPLE: UPPER BACK
LOCATION SIMPLE: RIGHT UPPER BACK

## 2020-01-29 NOTE — HPI: SKIN LESIONS
How Severe Is Your Skin Lesion?: mild
Have Your Skin Lesions Been Treated?: not been treated
Is This A New Presentation, Or A Follow-Up?: Skin Lesions
Additional History: Patient denies anything new or changing. Patient would just like to do an above the waist skin check today.

## 2020-01-29 NOTE — PROCEDURE: PATHOLOGY BILLING
Immunohistochemistry ([id number] and [id number]) billing is not performed here. Please use the Immunohistochemistry Stain Billing plan to accomplish this. Immunohistochemistry ([id number] and [id number]) billing is not performed here. Please use the Immunohistochemistry Stain Billing plan to accomplish this.

## 2020-01-29 NOTE — PROCEDURE: MIPS QUALITY
Quality 397: Melanoma: Reporting: The pathology report includes a pT Category and statement on thickness and ulceration for pT1, mitotic rate.
Quality 111:Pneumonia Vaccination Status For Older Adults: Pneumococcal Vaccination Previously Received
Quality 137: Melanoma: Continuity Of Care - Recall System: Patient information entered into a recall system that includes: target date for the next exam specified AND a process to follow up with patients regarding missed or unscheduled appointments
Quality 138: Melanoma: Coordination Of Care: A treatment plan was communicated to the physicians providing continuing care within one month of diagnosis outlining: diagnosis, tumor thickness and a plan for surgery or alternate care.
Quality 110: Preventive Care And Screening: Influenza Immunization: Influenza Immunization previously received during influenza season
Detail Level: Detailed

## 2020-01-29 NOTE — PROCEDURE: BIOPSY BY SHAVE METHOD
Was A Bandage Applied: Yes
Wound Care: Vaseline
Type Of Destruction Used: Curettage
X Size Of Lesion In Cm: 0
Cryotherapy Text: The wound bed was treated with cryotherapy after the biopsy was performed.
Hide Topical Anesthesia?: No
Depth Of Biopsy: dermis
Hemostasis: Electrodesiccation
Billing Type: Third-Party Bill
Electrodesiccation Text: The wound bed was treated with electrodesiccation after the biopsy was performed.
Accession #: [id number]
Consent: Written consent was obtained and risks were reviewed including but not limited to scarring, infection, bleeding, scabbing, incomplete removal, nerve damage and allergy to anesthesia.
Anesthesia Type: 2% lidocaine with epinephrine
Post-Care Instructions: I reviewed with the patient in detail post-care instructions. Patient is to keep the biopsy site dry overnight, and then apply bacitracin twice daily until healed. Patient may apply hydrogen peroxide soaks to remove any crusting.
Biopsy Method: Dermablade
Electrodesiccation And Curettage Text: The wound bed was treated with electrodesiccation and curettage after the biopsy was performed.
Biopsy Type: H and E
Notification Instructions: Patient will be notified of biopsy results. However, patient instructed to call the office if not contacted within 2 weeks.
Anesthesia Volume In Cc (Will Not Render If 0): 0.3
Silver Nitrate Text: The wound bed was treated with silver nitrate after the biopsy was performed.
Dressing: pressure dressing
Curettage Text: The wound bed was treated with curettage after the biopsy was performed.
Detail Level: Detailed
Path Notes (To The Dermatopathologist): Check margins

## 2020-01-29 NOTE — PROCEDURE: LIQUID NITROGEN
Number Of Freeze-Thaw Cycles: 1 freeze-thaw cycle
Include Z78.9 (Other Specified Conditions Influencing Health Status) As An Associated Diagnosis?: No
Medical Necessity Clause: This procedure was medically necessary because the lesions that were treated were:
Post-Care Instructions: I reviewed with the patient in detail post-care instructions. Patient is to wear sunprotection, and avoid picking at any of the treated lesions. Pt may apply Vaseline to crusted or scabbing areas.
Detail Level: Detailed
Consent: The patient's consent was obtained including but not limited to risks of crusting, scabbing, blistering, scarring, darker or lighter pigmentary change, recurrence, incomplete removal and infection.
Duration Of Freeze Thaw-Cycle (Seconds): 3
Medical Necessity Information: It is in your best interest to select a reason for this procedure from the list below. All of these items fulfill various CMS LCD requirements except the new and changing color options.

## 2020-03-16 ENCOUNTER — APPOINTMENT (RX ONLY)
Dept: URBAN - METROPOLITAN AREA CLINIC 349 | Facility: CLINIC | Age: 80
Setting detail: DERMATOLOGY
End: 2020-03-16

## 2020-03-16 DIAGNOSIS — Z85.820 PERSONAL HISTORY OF MALIGNANT MELANOMA OF SKIN: ICD-10-CM

## 2020-03-16 DIAGNOSIS — Z85.828 PERSONAL HISTORY OF OTHER MALIGNANT NEOPLASM OF SKIN: ICD-10-CM

## 2020-03-16 DIAGNOSIS — D485 NEOPLASM OF UNCERTAIN BEHAVIOR OF SKIN: ICD-10-CM

## 2020-03-16 PROBLEM — D48.5 NEOPLASM OF UNCERTAIN BEHAVIOR OF SKIN: Status: ACTIVE | Noted: 2020-03-16

## 2020-03-16 PROBLEM — D04.62 CARCINOMA IN SITU OF SKIN OF LEFT UPPER LIMB, INCLUDING SHOULDER: Status: ACTIVE | Noted: 2020-03-16

## 2020-03-16 PROBLEM — E05.90 THYROTOXICOSIS, UNSPECIFIED WITHOUT THYROTOXIC CRISIS OR STORM: Status: ACTIVE | Noted: 2020-03-16

## 2020-03-16 PROCEDURE — ? COUNSELING

## 2020-03-16 PROCEDURE — ? OTHER

## 2020-03-16 PROCEDURE — 17272 DSTR MAL LES S/N/H/F/G 1.1-2: CPT

## 2020-03-16 PROCEDURE — 99213 OFFICE O/P EST LOW 20 MIN: CPT | Mod: 25

## 2020-03-16 PROCEDURE — ? OBSERVATION

## 2020-03-16 PROCEDURE — ? CURETTAGE AND DESTRUCTION

## 2020-03-16 PROCEDURE — A4550 SURGICAL TRAYS: HCPCS

## 2020-03-16 ASSESSMENT — LOCATION DETAILED DESCRIPTION DERM
LOCATION DETAILED: SUPERIOR MID FOREHEAD
LOCATION DETAILED: LEFT DISTAL PRETIBIAL REGION
LOCATION DETAILED: INFERIOR THORACIC SPINE

## 2020-03-16 ASSESSMENT — LOCATION ZONE DERM
LOCATION ZONE: FACE
LOCATION ZONE: LEG
LOCATION ZONE: TRUNK

## 2020-03-16 ASSESSMENT — LOCATION SIMPLE DESCRIPTION DERM
LOCATION SIMPLE: SUPERIOR FOREHEAD
LOCATION SIMPLE: LEFT PRETIBIAL REGION
LOCATION SIMPLE: UPPER BACK

## 2020-03-16 NOTE — PROCEDURE: OTHER
Note Text (......Xxx Chief Complaint.): This diagnosis correlates with the
Other (Free Text): Patient had a few questions about procedure. Anaya discussed in detail with patient and explained that if patient was concerned about scarring then we could use a topical treatment. Patient would like to proceed with the edc. Counseled on risks and side effects.
Detail Level: Zone
Other (Free Text): Offered to biopsy today but patient feels comfortable watching for two months. Patient feels like she irritated Lesion. Will observe to ensure no evidence of SCC

## 2020-03-16 NOTE — PROCEDURE: CURETTAGE AND DESTRUCTION
Post-Care Instructions: I reviewed with the patient in detail post-care instructions. Patient is to keep the area dry for 48 hours, and not to engage in any swimming until the area is healed. Should the patient develop any fevers, chills, bleeding, severe pain patient will contact the office immediately.
Add Intralesional Injection: No
Bill For Surgical Tray: yes
What Was Performed First?: Curettage
Anesthesia Volume In Cc: 1
Consent was obtained from the patient. The risks, benefits and alternatives to therapy were discussed in detail. Specifically, the risks of infection, scarring, bleeding, prolonged wound healing, nerve injury, incomplete removal, allergy to anesthesia and recurrence were addressed. Alternatives to ED&C, such as: surgical removal and XRT were also discussed.  Prior to the procedure, the treatment site was clearly identified and confirmed by the patient. All components of Universal Protocol/PAUSE Rule completed.
Number Of Curettages: 3
Bill As A Line Item Or As Units: Line Item
Additional Information: (Optional): The wound was cleaned, and a pressure dressing was applied.  The patient received detailed post-op instructions.
Anesthesia Type: 2% lidocaine with epinephrine
Cautery Type: electrodesiccation
Size Of Lesion In Cm: 1.1
Detail Level: Detailed

## 2020-05-18 ENCOUNTER — APPOINTMENT (RX ONLY)
Dept: URBAN - METROPOLITAN AREA CLINIC 349 | Facility: CLINIC | Age: 80
Setting detail: DERMATOLOGY
End: 2020-05-18

## 2020-05-18 DIAGNOSIS — L57.0 ACTINIC KERATOSIS: ICD-10-CM

## 2020-05-18 DIAGNOSIS — D485 NEOPLASM OF UNCERTAIN BEHAVIOR OF SKIN: ICD-10-CM

## 2020-05-18 DIAGNOSIS — L30.9 DERMATITIS, UNSPECIFIED: ICD-10-CM

## 2020-05-18 PROBLEM — D48.5 NEOPLASM OF UNCERTAIN BEHAVIOR OF SKIN: Status: ACTIVE | Noted: 2020-05-18

## 2020-05-18 PROCEDURE — 96372 THER/PROPH/DIAG INJ SC/IM: CPT | Mod: 59

## 2020-05-18 PROCEDURE — ? OTHER

## 2020-05-18 PROCEDURE — 17000 DESTRUCT PREMALG LESION: CPT

## 2020-05-18 PROCEDURE — ? TREATMENT REGIMEN

## 2020-05-18 PROCEDURE — ? INTRAMUSCULAR KENALOG

## 2020-05-18 PROCEDURE — 99213 OFFICE O/P EST LOW 20 MIN: CPT | Mod: 25

## 2020-05-18 PROCEDURE — ? COUNSELING

## 2020-05-18 PROCEDURE — ? LIQUID NITROGEN

## 2020-05-18 ASSESSMENT — LOCATION ZONE DERM
LOCATION ZONE: ARM
LOCATION ZONE: TRUNK
LOCATION ZONE: LEG

## 2020-05-18 ASSESSMENT — LOCATION DETAILED DESCRIPTION DERM
LOCATION DETAILED: LEFT VENTRAL DISTAL FOREARM
LOCATION DETAILED: LEFT DISTAL PRETIBIAL REGION
LOCATION DETAILED: RIGHT VENTRAL DISTAL FOREARM
LOCATION DETAILED: RIGHT BUTTOCK

## 2020-05-18 ASSESSMENT — LOCATION SIMPLE DESCRIPTION DERM
LOCATION SIMPLE: LEFT FOREARM
LOCATION SIMPLE: LEFT PRETIBIAL REGION
LOCATION SIMPLE: RIGHT FOREARM
LOCATION SIMPLE: RIGHT BUTTOCK

## 2020-05-18 NOTE — HPI: RASH
How Severe Is Your Rash?: mild
Is This A New Presentation, Or A Follow-Up?: Rash
Additional History: Patient stated she thinks she has poison ivy on her arms. Patient stated she noticed rash yesterday. Patient stated she has some triamcinolone cream at home and stated applying it this morning.

## 2020-05-18 NOTE — PROCEDURE: INTRAMUSCULAR KENALOG
Detail Level: None
Concentration (Mg/Ml) Of Additional Medication: 2.5
Total Volume (Ccs): 2
Add Option For Additional Mediation: No
Concentration (Mg/Ml): 40.0
Administered By (Optional): Emily castellanos cst
Kenalog Preparation: kenalog
Consent: The risks of atrophy were reviewed with the patient.

## 2020-05-18 NOTE — PROCEDURE: OTHER
Detail Level: Zone
Other (Free Text): Lesion on patient’s left lower leg was examined today and appears stable and less irritated today. Advised she watch for changes. Will biopsy if it changes.
Other (Free Text): Patient thinks she came in contact with poison ivy but hasn’t been outside or doing yard work. Patient stated her  works out in the car but she also has a dog. \\n\\nPatient is sensitive to certain types of topical steroids and has requested to continue using the triamcinolone cream she has now. Offered to give her a stronger steroid but she declined. \\n\\nWill give patient steroid shot today.\\n\\nDeclines diabetes or kidney issues.
Note Text (......Xxx Chief Complaint.): This diagnosis correlates with the

## 2020-05-22 ENCOUNTER — RX ONLY (OUTPATIENT)
Age: 80
Setting detail: RX ONLY
End: 2020-05-22

## 2020-05-22 RX ORDER — TRIAMCINOLONE ACETONIDE 1 MG/G
CREAM TOPICAL
Qty: 1 | Refills: 1 | Status: ERX

## 2021-04-14 ENCOUNTER — APPOINTMENT (RX ONLY)
Dept: URBAN - METROPOLITAN AREA CLINIC 349 | Facility: CLINIC | Age: 81
Setting detail: DERMATOLOGY
End: 2021-04-14

## 2021-04-14 DIAGNOSIS — Z12.83 ENCOUNTER FOR SCREENING FOR MALIGNANT NEOPLASM OF SKIN: ICD-10-CM

## 2021-04-14 DIAGNOSIS — D18.0 HEMANGIOMA: ICD-10-CM

## 2021-04-14 DIAGNOSIS — Z71.89 OTHER SPECIFIED COUNSELING: ICD-10-CM

## 2021-04-14 DIAGNOSIS — L57.8 OTHER SKIN CHANGES DUE TO CHRONIC EXPOSURE TO NONIONIZING RADIATION: ICD-10-CM

## 2021-04-14 DIAGNOSIS — L57.0 ACTINIC KERATOSIS: ICD-10-CM

## 2021-04-14 DIAGNOSIS — Z85.820 PERSONAL HISTORY OF MALIGNANT MELANOMA OF SKIN: ICD-10-CM

## 2021-04-14 DIAGNOSIS — D22 MELANOCYTIC NEVI: ICD-10-CM

## 2021-04-14 DIAGNOSIS — Z85.828 PERSONAL HISTORY OF OTHER MALIGNANT NEOPLASM OF SKIN: ICD-10-CM

## 2021-04-14 DIAGNOSIS — L81.4 OTHER MELANIN HYPERPIGMENTATION: ICD-10-CM

## 2021-04-14 DIAGNOSIS — L82.1 OTHER SEBORRHEIC KERATOSIS: ICD-10-CM

## 2021-04-14 DIAGNOSIS — D485 NEOPLASM OF UNCERTAIN BEHAVIOR OF SKIN: ICD-10-CM

## 2021-04-14 PROBLEM — D48.5 NEOPLASM OF UNCERTAIN BEHAVIOR OF SKIN: Status: ACTIVE | Noted: 2021-04-14

## 2021-04-14 PROBLEM — D22.62 MELANOCYTIC NEVI OF LEFT UPPER LIMB, INCLUDING SHOULDER: Status: ACTIVE | Noted: 2021-04-14

## 2021-04-14 PROBLEM — M12.9 ARTHROPATHY, UNSPECIFIED: Status: ACTIVE | Noted: 2021-04-14

## 2021-04-14 PROBLEM — L20.84 INTRINSIC (ALLERGIC) ECZEMA: Status: ACTIVE | Noted: 2021-04-14

## 2021-04-14 PROBLEM — D18.01 HEMANGIOMA OF SKIN AND SUBCUTANEOUS TISSUE: Status: ACTIVE | Noted: 2021-04-14

## 2021-04-14 PROBLEM — D22.5 MELANOCYTIC NEVI OF TRUNK: Status: ACTIVE | Noted: 2021-04-14

## 2021-04-14 PROBLEM — L23.7 ALLERGIC CONTACT DERMATITIS DUE TO PLANTS, EXCEPT FOOD: Status: ACTIVE | Noted: 2021-04-14

## 2021-04-14 PROBLEM — F41.9 ANXIETY DISORDER, UNSPECIFIED: Status: ACTIVE | Noted: 2021-04-14

## 2021-04-14 PROBLEM — D22.72 MELANOCYTIC NEVI OF LEFT LOWER LIMB, INCLUDING HIP: Status: ACTIVE | Noted: 2021-04-14

## 2021-04-14 PROBLEM — D04.62 CARCINOMA IN SITU OF SKIN OF LEFT UPPER LIMB, INCLUDING SHOULDER: Status: ACTIVE | Noted: 2021-04-14

## 2021-04-14 PROBLEM — K75.9 INFLAMMATORY LIVER DISEASE, UNSPECIFIED: Status: ACTIVE | Noted: 2021-04-14

## 2021-04-14 PROCEDURE — ? PATHOLOGY BILLING

## 2021-04-14 PROCEDURE — ? BIOPSY BY SHAVE METHOD

## 2021-04-14 PROCEDURE — A4550 SURGICAL TRAYS: HCPCS

## 2021-04-14 PROCEDURE — ? SUNSCREEN RECOMMENDATIONS

## 2021-04-14 PROCEDURE — ? LIQUID NITROGEN

## 2021-04-14 PROCEDURE — 11102 TANGNTL BX SKIN SINGLE LES: CPT

## 2021-04-14 PROCEDURE — 17003 DESTRUCT PREMALG LES 2-14: CPT

## 2021-04-14 PROCEDURE — 99214 OFFICE O/P EST MOD 30 MIN: CPT | Mod: 25

## 2021-04-14 PROCEDURE — 88305 TISSUE EXAM BY PATHOLOGIST: CPT

## 2021-04-14 PROCEDURE — ? COUNSELING

## 2021-04-14 PROCEDURE — ? PRESCRIPTION MEDICATION MANAGEMENT

## 2021-04-14 PROCEDURE — 17000 DESTRUCT PREMALG LESION: CPT | Mod: 59

## 2021-04-14 ASSESSMENT — LOCATION DETAILED DESCRIPTION DERM
LOCATION DETAILED: LEFT RIB CAGE
LOCATION DETAILED: SUPERIOR MID FOREHEAD
LOCATION DETAILED: RIGHT LATERAL SUPERIOR CHEST
LOCATION DETAILED: RIGHT PROXIMAL DORSAL FOREARM
LOCATION DETAILED: LEFT PROXIMAL PRETIBIAL REGION
LOCATION DETAILED: LEFT UPPER CUTANEOUS LIP
LOCATION DETAILED: LEFT MID-UPPER BACK
LOCATION DETAILED: LEFT INFERIOR FRONTAL SCALP
LOCATION DETAILED: RIGHT PROXIMAL POSTERIOR UPPER ARM
LOCATION DETAILED: LEFT SUPERIOR CENTRAL MALAR CHEEK
LOCATION DETAILED: LEFT ANTERIOR SHOULDER
LOCATION DETAILED: NASAL DORSUM
LOCATION DETAILED: RIGHT DISTAL DORSAL FOREARM
LOCATION DETAILED: LEFT VENTRAL LATERAL DISTAL FOREARM
LOCATION DETAILED: LEFT SUPERIOR UPPER BACK
LOCATION DETAILED: LEFT PROXIMAL RADIAL DORSAL FOREARM
LOCATION DETAILED: LEFT ANTERIOR PROXIMAL UPPER ARM
LOCATION DETAILED: LEFT VENTRAL PROXIMAL FOREARM
LOCATION DETAILED: INFERIOR THORACIC SPINE
LOCATION DETAILED: RIGHT SUPERIOR LATERAL BUCCAL CHEEK
LOCATION DETAILED: RIGHT CENTRAL MALAR CHEEK
LOCATION DETAILED: LEFT ANTERIOR DISTAL THIGH
LOCATION DETAILED: RIGHT ANTERIOR PROXIMAL THIGH
LOCATION DETAILED: LEFT DISTAL DORSAL FOREARM
LOCATION DETAILED: MID TRAPEZIAL NECK
LOCATION DETAILED: LEFT SUPERIOR HELIX
LOCATION DETAILED: LEFT INFERIOR CENTRAL MALAR CHEEK
LOCATION DETAILED: LEFT INFERIOR MEDIAL MIDBACK
LOCATION DETAILED: PERIUMBILICAL SKIN

## 2021-04-14 ASSESSMENT — LOCATION SIMPLE DESCRIPTION DERM
LOCATION SIMPLE: RIGHT FOREARM
LOCATION SIMPLE: CHEST
LOCATION SIMPLE: SCALP
LOCATION SIMPLE: LEFT UPPER BACK
LOCATION SIMPLE: NOSE
LOCATION SIMPLE: LEFT SHOULDER
LOCATION SIMPLE: LEFT FOREARM
LOCATION SIMPLE: LEFT THIGH
LOCATION SIMPLE: LEFT CHEEK
LOCATION SIMPLE: LEFT UPPER ARM
LOCATION SIMPLE: SUPERIOR FOREHEAD
LOCATION SIMPLE: ABDOMEN
LOCATION SIMPLE: LEFT EAR
LOCATION SIMPLE: LEFT PRETIBIAL REGION
LOCATION SIMPLE: TRAPEZIAL NECK
LOCATION SIMPLE: RIGHT POSTERIOR UPPER ARM
LOCATION SIMPLE: RIGHT CHEEK
LOCATION SIMPLE: RIGHT THIGH
LOCATION SIMPLE: LEFT LIP
LOCATION SIMPLE: LEFT LOWER BACK
LOCATION SIMPLE: UPPER BACK

## 2021-04-14 ASSESSMENT — LOCATION ZONE DERM
LOCATION ZONE: LEG
LOCATION ZONE: NECK
LOCATION ZONE: NOSE
LOCATION ZONE: TRUNK
LOCATION ZONE: SCALP
LOCATION ZONE: LIP
LOCATION ZONE: FACE
LOCATION ZONE: EAR
LOCATION ZONE: ARM

## 2021-04-14 NOTE — PROCEDURE: LIQUID NITROGEN
Duration Of Freeze Thaw-Cycle (Seconds): 3
Render Note In Bullet Format When Appropriate: No
Number Of Freeze-Thaw Cycles: 1 freeze-thaw cycle
Consent: The patient's consent was obtained including but not limited to risks of crusting, scabbing, blistering, scarring, darker or lighter pigmentary change, recurrence, incomplete removal and infection.
Post-Care Instructions: I reviewed with the patient in detail post-care instructions. Patient is to wear sunprotection, and avoid picking at any of the treated lesions. Pt may apply Vaseline to crusted or scabbing areas.
Detail Level: Detailed

## 2021-04-14 NOTE — PROCEDURE: MIPS QUALITY
Detail Level: Detailed
Quality 110: Preventive Care And Screening: Influenza Immunization: Influenza Immunization previously received during influenza season
Quality 397: Melanoma: Reporting: The pathology report includes a pT Category and statement on thickness and ulceration for pT1, mitotic rate.
Quality 111:Pneumonia Vaccination Status For Older Adults: Pneumococcal Vaccination Previously Received
Quality 137: Melanoma: Continuity Of Care - Recall System: Patient information entered into a recall system that includes: target date for the next exam specified AND a process to follow up with patients regarding missed or unscheduled appointments
Quality 138: Melanoma: Coordination Of Care: A treatment plan was communicated to the physicians providing continuing care within one month of diagnosis outlining: diagnosis, tumor thickness and a plan for surgery or alternate care.

## 2021-04-14 NOTE — PROCEDURE: BIOPSY BY SHAVE METHOD
Post-Care Instructions: I reviewed with the patient in detail post-care instructions. Patient is to keep the biopsy site dry overnight, and then apply bacitracin twice daily until healed. Patient may apply hydrogen peroxide soaks to remove any crusting.
Electrodesiccation And Curettage Text: The wound bed was treated with electrodesiccation and curettage after the biopsy was performed.
Bill For Surgical Tray: yes
Validate Lesion Size: No
Anesthesia Volume In Cc (Will Not Render If 0): 1
Silver Nitrate Text: The wound bed was treated with silver nitrate after the biopsy was performed.
Billing Type: Third-Party Bill
Anesthesia Type: 2% lidocaine with epinephrine
Biopsy Method: Dermablade
Detail Level: Detailed
Size Of Lesion In Cm: 0
Dressing: Band-Aid
Path Notes (To The Dermatopathologist): Check margins
Information: Selecting Yes will display possible errors in your note based on the variables you have selected. This validation is only offered as a suggestion for you. PLEASE NOTE THAT THE VALIDATION TEXT WILL BE REMOVED WHEN YOU FINALIZE YOUR NOTE. IF YOU WANT TO FAX A PRELIMINARY NOTE YOU WILL NEED TO TOGGLE THIS TO 'NO' IF YOU DO NOT WANT IT IN YOUR FAXED NOTE.
Curettage Text: The wound bed was treated with curettage after the biopsy was performed.
Consent: Written consent was obtained and risks were reviewed including but not limited to scarring, infection, bleeding, scabbing, incomplete removal, nerve damage and allergy to anesthesia.
Biopsy Type: H and E
Wound Care: Vaseline
Type Of Destruction Used: Curettage
Cryotherapy Text: The wound bed was treated with cryotherapy after the biopsy was performed.
Notification Instructions: Patient will be notified of biopsy results. However, patient instructed to call the office if not contacted within 2 weeks.
Depth Of Biopsy: dermis
Hemostasis: Electrodesiccation
Accession #: [id number]
Electrodesiccation Text: The wound bed was treated with electrodesiccation after the biopsy was performed.

## 2021-04-14 NOTE — PROCEDURE: SUNSCREEN RECOMMENDATIONS
Detail Level: Generalized
Products Recommended: mineral based sunscreens.
General Sunscreen Counseling: I recommended a broad spectrum sunscreen with a SPF of 30 or higher.  I explained that SPF 30 sunscreens block approximately 97 percent of the sun's harmful rays.  Sunscreens should be applied at least 15 minutes prior to expected sun exposure and then every 2 hours after that as long as sun exposure continues. If swimming or exercising sunscreen should be reapplied every 45 minutes to an hour after getting wet or sweating.  One ounce, or the equivalent of a shot glass full of sunscreen, is adequate to protect the skin not covered by a bathing suit. I also recommended a lip balm with a sunscreen as well. Sun protective clothing can be used in lieu of sunscreen but must be worn the entire time you are exposed to the sun's rays.

## 2021-04-14 NOTE — PROCEDURE: PATHOLOGY BILLING
Immunohistochemistry ([id number] and [id number]) billing is not performed here. Please use the Immunohistochemistry Stain Billing plan to accomplish this. Immunohistochemistry ([id number] and [id number]) billing is not performed here. Please use the Immunohistochemistry Stain Billing plan to accomplish this.

## 2021-05-07 ENCOUNTER — APPOINTMENT (RX ONLY)
Dept: URBAN - METROPOLITAN AREA CLINIC 349 | Facility: CLINIC | Age: 81
Setting detail: DERMATOLOGY
End: 2021-05-07

## 2021-05-07 PROBLEM — D04.62 CARCINOMA IN SITU OF SKIN OF LEFT UPPER LIMB, INCLUDING SHOULDER: Status: ACTIVE | Noted: 2021-05-07

## 2021-05-07 PROCEDURE — A4550 SURGICAL TRAYS: HCPCS

## 2021-05-07 PROCEDURE — 17262 DSTRJ MAL LES T/A/L 1.1-2.0: CPT

## 2021-05-07 PROCEDURE — ? COUNSELING

## 2021-05-07 PROCEDURE — ? CURETTAGE AND DESTRUCTION

## 2021-05-07 NOTE — PROCEDURE: CURETTAGE AND DESTRUCTION
Additional Information: (Optional): The wound was cleaned, and a pressure dressing was applied.  The patient received detailed post-op instructions.
Add Intralesional Injection: No
Anesthesia Volume In Cc: 3
Biopsy Photograph Reviewed: Yes
What Was Performed First?: Curettage
Bill As A Line Item Or As Units: Line Item
Detail Level: Detailed
Anesthesia Type: 2% lidocaine with epinephrine
Cautery Type: electrodesiccation
Size Of Lesion In Cm: 1.2
Final Size Statement: The size of the lesion after curettage was
Consent was obtained from the patient. The risks, benefits and alternatives to therapy were discussed in detail. Specifically, the risks of infection, scarring, bleeding, prolonged wound healing, nerve injury, incomplete removal, allergy to anesthesia and recurrence were addressed. Alternatives to ED&C, such as: surgical removal and XRT were also discussed.  Prior to the procedure, the treatment site was clearly identified and confirmed by the patient. All components of Universal Protocol/PAUSE Rule completed.
Total Volume (Ccs): 1
Post-Care Instructions: I reviewed with the patient in detail post-care instructions. Patient is to keep the area dry for 48 hours, and not to engage in any swimming until the area is healed. Should the patient develop any fevers, chills, bleeding, severe pain patient will contact the office immediately.

## 2021-05-07 NOTE — PROCEDURE: MIPS QUALITY
Quality 111:Pneumonia Vaccination Status For Older Adults: Pneumococcal Vaccination Previously Received
Detail Level: Detailed
Quality 110: Preventive Care And Screening: Influenza Immunization: Influenza Immunization previously received during influenza season
Quality 397: Melanoma: Reporting: The pathology report includes a pT Category and statement on thickness and ulceration for pT1, mitotic rate.
Quality 137: Melanoma: Continuity Of Care - Recall System: Patient information entered into a recall system that includes: target date for the next exam specified AND a process to follow up with patients regarding missed or unscheduled appointments
Quality 138: Melanoma: Coordination Of Care: A treatment plan was communicated to the physicians providing continuing care within one month of diagnosis outlining: diagnosis, tumor thickness and a plan for surgery or alternate care.

## 2021-10-13 ENCOUNTER — APPOINTMENT (RX ONLY)
Dept: URBAN - METROPOLITAN AREA CLINIC 349 | Facility: CLINIC | Age: 81
Setting detail: DERMATOLOGY
End: 2021-10-13

## 2021-10-13 DIAGNOSIS — L81.4 OTHER MELANIN HYPERPIGMENTATION: ICD-10-CM

## 2021-10-13 DIAGNOSIS — L82.1 OTHER SEBORRHEIC KERATOSIS: ICD-10-CM

## 2021-10-13 DIAGNOSIS — D22 MELANOCYTIC NEVI: ICD-10-CM

## 2021-10-13 DIAGNOSIS — Z85.828 PERSONAL HISTORY OF OTHER MALIGNANT NEOPLASM OF SKIN: ICD-10-CM

## 2021-10-13 DIAGNOSIS — Z12.83 ENCOUNTER FOR SCREENING FOR MALIGNANT NEOPLASM OF SKIN: ICD-10-CM

## 2021-10-13 DIAGNOSIS — Z85.820 PERSONAL HISTORY OF MALIGNANT MELANOMA OF SKIN: ICD-10-CM

## 2021-10-13 DIAGNOSIS — D18.0 HEMANGIOMA: ICD-10-CM

## 2021-10-13 DIAGNOSIS — L85.3 XEROSIS CUTIS: ICD-10-CM

## 2021-10-13 PROBLEM — I10 ESSENTIAL (PRIMARY) HYPERTENSION: Status: ACTIVE | Noted: 2021-10-13

## 2021-10-13 PROBLEM — E05.90 THYROTOXICOSIS, UNSPECIFIED WITHOUT THYROTOXIC CRISIS OR STORM: Status: ACTIVE | Noted: 2021-10-13

## 2021-10-13 PROBLEM — D22.5 MELANOCYTIC NEVI OF TRUNK: Status: ACTIVE | Noted: 2021-10-13

## 2021-10-13 PROBLEM — D18.01 HEMANGIOMA OF SKIN AND SUBCUTANEOUS TISSUE: Status: ACTIVE | Noted: 2021-10-13

## 2021-10-13 PROBLEM — L23.7 ALLERGIC CONTACT DERMATITIS DUE TO PLANTS, EXCEPT FOOD: Status: ACTIVE | Noted: 2021-10-13

## 2021-10-13 PROBLEM — M12.9 ARTHROPATHY, UNSPECIFIED: Status: ACTIVE | Noted: 2021-10-13

## 2021-10-13 PROCEDURE — 99213 OFFICE O/P EST LOW 20 MIN: CPT

## 2021-10-13 PROCEDURE — ? COUNSELING

## 2021-10-13 ASSESSMENT — LOCATION SIMPLE DESCRIPTION DERM
LOCATION SIMPLE: LEFT FOREARM
LOCATION SIMPLE: RIGHT CHEEK
LOCATION SIMPLE: LEFT BREAST
LOCATION SIMPLE: RIGHT UPPER BACK
LOCATION SIMPLE: LEFT LOWER BACK
LOCATION SIMPLE: SUPERIOR FOREHEAD
LOCATION SIMPLE: LEFT UPPER BACK
LOCATION SIMPLE: LEFT HAND
LOCATION SIMPLE: ABDOMEN
LOCATION SIMPLE: UPPER BACK

## 2021-10-13 ASSESSMENT — LOCATION DETAILED DESCRIPTION DERM
LOCATION DETAILED: INFERIOR THORACIC SPINE
LOCATION DETAILED: 2ND WEB SPACE LEFT HAND
LOCATION DETAILED: LEFT MEDIAL BREAST 10-11:00 REGION
LOCATION DETAILED: LEFT SUPERIOR LATERAL UPPER BACK
LOCATION DETAILED: RIGHT SUPERIOR PREAURICULAR CHEEK
LOCATION DETAILED: SUPERIOR MID FOREHEAD
LOCATION DETAILED: LEFT SUPERIOR LATERAL LOWER BACK
LOCATION DETAILED: LEFT INFERIOR LATERAL UPPER BACK
LOCATION DETAILED: RIGHT SUPERIOR UPPER BACK
LOCATION DETAILED: LEFT DISTAL DORSAL FOREARM
LOCATION DETAILED: LEFT LATERAL ABDOMEN

## 2021-10-13 ASSESSMENT — LOCATION ZONE DERM
LOCATION ZONE: ARM
LOCATION ZONE: FACE
LOCATION ZONE: TRUNK
LOCATION ZONE: HAND

## 2021-10-13 NOTE — PROCEDURE: MIPS QUALITY
Quality 137: Melanoma: Continuity Of Care - Recall System: Patient information entered into a recall system that includes: target date for the next exam specified AND a process to follow up with patients regarding missed or unscheduled appointments
Quality 138: Melanoma: Coordination Of Care: A treatment plan was communicated to the physicians providing continuing care within one month of diagnosis outlining: diagnosis, tumor thickness and a plan for surgery or alternate care.
Quality 397: Melanoma: Reporting: The pathology report includes a pT Category and statement on thickness and ulceration for pT1, mitotic rate.
Quality 111:Pneumonia Vaccination Status For Older Adults: Pneumococcal Vaccination Previously Received
Detail Level: Detailed
Quality 110: Preventive Care And Screening: Influenza Immunization: Influenza Immunization previously received during influenza season

## 2022-04-13 ENCOUNTER — APPOINTMENT (RX ONLY)
Dept: URBAN - METROPOLITAN AREA CLINIC 329 | Facility: CLINIC | Age: 82
Setting detail: DERMATOLOGY
End: 2022-04-13

## 2022-04-13 DIAGNOSIS — L81.4 OTHER MELANIN HYPERPIGMENTATION: ICD-10-CM

## 2022-04-13 DIAGNOSIS — Z85.820 PERSONAL HISTORY OF MALIGNANT MELANOMA OF SKIN: ICD-10-CM

## 2022-04-13 DIAGNOSIS — D18.0 HEMANGIOMA: ICD-10-CM

## 2022-04-13 DIAGNOSIS — D22 MELANOCYTIC NEVI: ICD-10-CM

## 2022-04-13 DIAGNOSIS — Z71.89 OTHER SPECIFIED COUNSELING: ICD-10-CM

## 2022-04-13 DIAGNOSIS — L82.1 OTHER SEBORRHEIC KERATOSIS: ICD-10-CM

## 2022-04-13 DIAGNOSIS — L57.0 ACTINIC KERATOSIS: ICD-10-CM

## 2022-04-13 DIAGNOSIS — L21.8 OTHER SEBORRHEIC DERMATITIS: ICD-10-CM | Status: INADEQUATELY CONTROLLED

## 2022-04-13 DIAGNOSIS — Z85.828 PERSONAL HISTORY OF OTHER MALIGNANT NEOPLASM OF SKIN: ICD-10-CM

## 2022-04-13 PROBLEM — D18.01 HEMANGIOMA OF SKIN AND SUBCUTANEOUS TISSUE: Status: ACTIVE | Noted: 2022-04-13

## 2022-04-13 PROBLEM — D22.5 MELANOCYTIC NEVI OF TRUNK: Status: ACTIVE | Noted: 2022-04-13

## 2022-04-13 PROCEDURE — 17000 DESTRUCT PREMALG LESION: CPT

## 2022-04-13 PROCEDURE — ? COUNSELING

## 2022-04-13 PROCEDURE — ? MEDICAL PHOTOGRAPHY REVIEW

## 2022-04-13 PROCEDURE — ? TREATMENT REGIMEN

## 2022-04-13 PROCEDURE — ? ADDITIONAL NOTES

## 2022-04-13 PROCEDURE — ? PRESCRIPTION

## 2022-04-13 PROCEDURE — ? PRESCRIPTION MEDICATION MANAGEMENT

## 2022-04-13 PROCEDURE — 17003 DESTRUCT PREMALG LES 2-14: CPT

## 2022-04-13 PROCEDURE — ? FULL BODY SKIN EXAM

## 2022-04-13 PROCEDURE — ? LIQUID NITROGEN

## 2022-04-13 PROCEDURE — 99214 OFFICE O/P EST MOD 30 MIN: CPT | Mod: 25

## 2022-04-13 RX ORDER — KETOCONAZOLE 20 MG/G
CREAM TOPICAL
Qty: 60 | Refills: 3 | Status: ERX | COMMUNITY
Start: 2022-04-13

## 2022-04-13 RX ORDER — MOMETASONE FUROATE 1 MG/G
OINTMENT TOPICAL
Qty: 45 | Refills: 0 | Status: ERX | COMMUNITY
Start: 2022-04-13

## 2022-04-13 RX ADMIN — MOMETASONE FUROATE: 1 OINTMENT TOPICAL at 00:00

## 2022-04-13 RX ADMIN — KETOCONAZOLE: 20 CREAM TOPICAL at 00:00

## 2022-04-13 ASSESSMENT — LOCATION DETAILED DESCRIPTION DERM
LOCATION DETAILED: 2ND WEB SPACE LEFT HAND
LOCATION DETAILED: MIDDLE STERNUM
LOCATION DETAILED: EPIGASTRIC SKIN
LOCATION DETAILED: RIGHT CENTRAL LATERAL NECK
LOCATION DETAILED: LEFT DISTAL DORSAL FOREARM
LOCATION DETAILED: INFERIOR THORACIC SPINE
LOCATION DETAILED: LEFT INFERIOR UPPER BACK
LOCATION DETAILED: RIGHT SUPERIOR MEDIAL BUCCAL CHEEK
LOCATION DETAILED: LEFT RADIAL DORSAL HAND
LOCATION DETAILED: GLABELLA

## 2022-04-13 ASSESSMENT — LOCATION ZONE DERM
LOCATION ZONE: TRUNK
LOCATION ZONE: ARM
LOCATION ZONE: HAND
LOCATION ZONE: NECK
LOCATION ZONE: FACE

## 2022-04-13 ASSESSMENT — LOCATION SIMPLE DESCRIPTION DERM
LOCATION SIMPLE: GLABELLA
LOCATION SIMPLE: LEFT FOREARM
LOCATION SIMPLE: UPPER BACK
LOCATION SIMPLE: NECK
LOCATION SIMPLE: LEFT HAND
LOCATION SIMPLE: RIGHT CHEEK
LOCATION SIMPLE: CHEST
LOCATION SIMPLE: ABDOMEN
LOCATION SIMPLE: LEFT UPPER BACK

## 2022-04-13 NOTE — PROCEDURE: PRESCRIPTION MEDICATION MANAGEMENT
Render In Strict Bullet Format?: No
Initiate Treatment: Mometasone oint bid for two weeks\\n \\nKetoconazole cream twice daily for two weeks then 1-2x weekly for maintenance
Detail Level: Zone

## 2022-07-11 ENCOUNTER — APPOINTMENT (RX ONLY)
Dept: URBAN - METROPOLITAN AREA CLINIC 329 | Facility: CLINIC | Age: 82
Setting detail: DERMATOLOGY
End: 2022-07-11

## 2022-07-11 DIAGNOSIS — L81.4 OTHER MELANIN HYPERPIGMENTATION: ICD-10-CM

## 2022-07-11 DIAGNOSIS — Z85.828 PERSONAL HISTORY OF OTHER MALIGNANT NEOPLASM OF SKIN: ICD-10-CM

## 2022-07-11 DIAGNOSIS — L85.3 XEROSIS CUTIS: ICD-10-CM

## 2022-07-11 DIAGNOSIS — L21.8 OTHER SEBORRHEIC DERMATITIS: ICD-10-CM | Status: INADEQUATELY CONTROLLED

## 2022-07-11 PROCEDURE — ? PRESCRIPTION

## 2022-07-11 PROCEDURE — ? COUNSELING

## 2022-07-11 PROCEDURE — 99214 OFFICE O/P EST MOD 30 MIN: CPT

## 2022-07-11 RX ORDER — KETOCONAZOLE 20 MG/G
CREAM TOPICAL
Qty: 60 | Refills: 3 | Status: ERX

## 2022-07-11 RX ORDER — MOMETASONE FUROATE 1 MG/G
CREAM TOPICAL
Qty: 45 | Refills: 3 | Status: ERX | COMMUNITY
Start: 2022-07-11

## 2022-07-11 RX ADMIN — MOMETASONE FUROATE: 1 CREAM TOPICAL at 00:00

## 2022-07-11 ASSESSMENT — LOCATION ZONE DERM
LOCATION ZONE: FACE
LOCATION ZONE: SCALP
LOCATION ZONE: TRUNK

## 2022-07-11 ASSESSMENT — LOCATION SIMPLE DESCRIPTION DERM
LOCATION SIMPLE: HAIR
LOCATION SIMPLE: CHEST
LOCATION SIMPLE: LEFT FOREHEAD
LOCATION SIMPLE: RIGHT UPPER BACK
LOCATION SIMPLE: INFERIOR FOREHEAD
LOCATION SIMPLE: UPPER BACK

## 2022-07-11 ASSESSMENT — LOCATION DETAILED DESCRIPTION DERM
LOCATION DETAILED: LEFT MEDIAL FOREHEAD
LOCATION DETAILED: RIGHT SUPERIOR MEDIAL UPPER BACK
LOCATION DETAILED: RIGHT MEDIAL SUPERIOR CHEST
LOCATION DETAILED: HAIR
LOCATION DETAILED: INFERIOR MID FOREHEAD
LOCATION DETAILED: SUPERIOR THORACIC SPINE

## 2022-07-11 NOTE — PROCEDURE: MIPS QUALITY
Quality 130: Documentation Of Current Medications In The Medical Record: Current Medications Documented
Quality 431: Preventive Care And Screening: Unhealthy Alcohol Use - Screening: Patient not identified as an unhealthy alcohol user when screened for unhealthy alcohol use using a systematic screening method
Quality 110: Preventive Care And Screening: Influenza Immunization: Influenza Immunization Administered during Influenza season
Quality 47: Advance Care Plan: Advance Care Planning discussed and documented; advance care plan or surrogate decision maker documented in the medical record.
Detail Level: Zone
Quality 226: Preventive Care And Screening: Tobacco Use: Screening And Cessation Intervention: Patient screened for tobacco use and is an ex/non-smoker
Quality 111:Pneumonia Vaccination Status For Older Adults: Pneumococcal vaccine administered on or after patient’s 60th birthday and before the end of the measurement period

## 2022-07-29 ENCOUNTER — TELEPHONE (OUTPATIENT)
Dept: ORTHOPEDIC SURGERY | Age: 82
End: 2022-07-29

## 2022-07-29 NOTE — TELEPHONE ENCOUNTER
Pt  will bring  disc  and  report  to  katy  in  appts  for  CDV  to  review  Referral is  from pain  mgt  and  she  gets injections

## 2022-08-26 ENCOUNTER — TELEPHONE (OUTPATIENT)
Dept: ORTHOPEDIC SURGERY | Age: 82
End: 2022-08-26

## 2022-09-12 ENCOUNTER — OFFICE VISIT (OUTPATIENT)
Dept: ORTHOPEDIC SURGERY | Age: 82
End: 2022-09-12
Payer: COMMERCIAL

## 2022-09-12 VITALS — BODY MASS INDEX: 30.9 KG/M2 | HEIGHT: 64 IN | WEIGHT: 181 LBS

## 2022-09-12 DIAGNOSIS — M48.062 SPINAL STENOSIS OF LUMBAR REGION WITH NEUROGENIC CLAUDICATION: ICD-10-CM

## 2022-09-12 DIAGNOSIS — Z98.890 HISTORY OF LUMBAR SURGERY: ICD-10-CM

## 2022-09-12 DIAGNOSIS — M43.16 SPONDYLOLISTHESIS OF LUMBAR REGION: Primary | ICD-10-CM

## 2022-09-12 PROCEDURE — 1123F ACP DISCUSS/DSCN MKR DOCD: CPT | Performed by: ORTHOPAEDIC SURGERY

## 2022-09-12 PROCEDURE — 99204 OFFICE O/P NEW MOD 45 MIN: CPT | Performed by: ORTHOPAEDIC SURGERY

## 2022-09-12 RX ORDER — LOVASTATIN 10 MG/1
TABLET ORAL
COMMUNITY
Start: 2022-08-15

## 2022-09-12 RX ORDER — ACETAMINOPHEN 500 MG
500 TABLET ORAL EVERY 6 HOURS PRN
COMMUNITY

## 2022-09-12 RX ORDER — KETOCONAZOLE 20 MG/G
CREAM TOPICAL
COMMUNITY
Start: 2022-07-11

## 2022-09-12 RX ORDER — MOMETASONE FUROATE 1 MG/G
CREAM TOPICAL
COMMUNITY
Start: 2022-07-11

## 2022-09-12 RX ORDER — TAMOXIFEN CITRATE 20 MG/1
20 TABLET ORAL DAILY
COMMUNITY
Start: 2021-09-28

## 2022-09-12 RX ORDER — POLYETHYLENE GLYCOL 3350 17 G/17G
POWDER, FOR SOLUTION ORAL PRN
COMMUNITY

## 2022-09-12 RX ORDER — ASPIRIN 81 MG/1
81 TABLET ORAL
COMMUNITY

## 2022-09-12 RX ORDER — LISINOPRIL 20 MG/1
TABLET ORAL
COMMUNITY
Start: 2022-09-09

## 2022-09-12 RX ORDER — FAMOTIDINE 20 MG/1
TABLET, FILM COATED ORAL
COMMUNITY
Start: 2022-07-23

## 2022-09-12 RX ORDER — LEVOTHYROXINE SODIUM 0.05 MG/1
TABLET ORAL
COMMUNITY
Start: 2022-08-03

## 2022-09-12 RX ORDER — PANTOPRAZOLE SODIUM 40 MG/1
TABLET, DELAYED RELEASE ORAL
COMMUNITY
Start: 2022-09-02

## 2022-09-12 RX ORDER — TAMOXIFEN CITRATE 20 MG/1
TABLET ORAL
COMMUNITY
Start: 2022-06-10 | End: 2022-09-12

## 2022-09-12 RX ORDER — CITALOPRAM 20 MG/1
TABLET ORAL
COMMUNITY
Start: 2022-07-27

## 2022-09-12 RX ORDER — IBUPROFEN 200 MG
400 TABLET ORAL PRN
COMMUNITY

## 2022-09-12 NOTE — PATIENT INSTRUCTIONS
Discharge Instructions - Spine Surgery    Wound Care and Showering  Your wound will typically be covered with a clear mesh and glue, which is waterproof sufficient for showering but not soaking in a bath. If there is some drainage or bleeding from under the glue, the area should be covered with gauze and secured with tape or Tegaderm (purchased at a pharmacy) until the drainage stops. Tegaderm is preferable since it is waterproof and can be worn in the shower. Once the drainage stops, the outer gauze and Tegaderm dressing can be removed, while leaving the glue layer in place for 10-14 days. Hair washing is permissible while in the shower. No tub baths, hot tubs or whirlpools until seen in the office. Once the wound is healed, the glue can be removed by dissolving with a triple-antibiotic or we can remove it at your first post operative visit. .       If any of the following should occur, please call the office:  Fevers greater than 101 degrees  Increased redness or large amount of swelling around incision      Exercise  You have unlimited walking and stair climbing privileges. Walking outside or walking on a treadmill without an incline is also allowed. Do NOT lift anything weighing greater than 10-15 lbs. Especially try to avoid lifting or reaching above your head. Sleeping  You may sleep in any comfortable position. Many patients find comfort sleeping in a recliner chair. It is normal to have difficulty sleeping for the first several weeks following your surgery. We recommend trying Benadryl, Melatonin, or Tylenol PM for help sleeping. All are over-the-counter and can be found in drugstores. Eating  Because of the tubes in your throat while asleep during surgery, it is normal to have a sore throat and some difficulty swallowing solid foods after your surgery. This may persist for several weeks. Eating soft foods like yogurt, macaroni and mashed potatoes seem to help.     Pain  If you feel you need pain medicine, you may take regular or extra-strength Tylenol. As long as your surgery was not a fusion (i.e. with screws) you can also take an anti-inflammatory medication such as Advil, Aleve, or Motrin. However, if your surgery did involve a fusion, NSAIDS are not recommended for the first 8 weeks because it delays bone growth. Do not resume taking Fosamax for 8 weeks after your fusion surgery. To help alleviate persistent soreness around the incision and muscles, apply ice or warm moist compresses. Driving  You may NOT drive a car until told otherwise by your physician. You may be a passenger for short distances (about 20-30 minutes.) If you must take a longer trip, be sure to make several pit stops so that you can walk and stretch your legs. Reclining in the passenger seat seems to be the most comfortable position for most patients. In some states, it is illegal to drive a car while wearing a neck brace. Follow-Up Appointments  When you are discharged from the hospital, a follow up appointment will be made for 2-3 weeks from your surgery date. Either check [employer] or call [phone] to confirm your follow-up appointment.

## 2022-09-12 NOTE — PROGRESS NOTES
Name: Krys Stern  YOB: 1940  Gender: female  MRN: [id number]  Age: [de-identified] y.o. Chief complaint: Back and buttock pain with activities. History of present illness: This is a very pleasant [de-identified] y.o. old female who presents with a longstanding history of low back pain with episodic radiation to the buttocks and lower extremities,  on the bilateral   side. The onset of the symptoms was rather insidious. The patient describes the quality of the pain as a deep ache. The patient has noticed a progressive decrease in her  ability to walk or stand for any extended period of time. Her  walking and standing pain is usually relieved with sitting. She  has noted that pushing a cart in the store seems to help. She has been dealing with urinary incontinence for quite some time now. This patient has had lumbar surgery in the past including a previous laminectomy. Despite her pain and debilitation, she continues to go to a senior fusion activity. She has also done physical therapy. She takes a regimen of ibuprofen daily. Medications:       Current Outpatient Medications:     tamoxifen (NOLVADEX) 20 MG tablet, Take 20 mg by mouth daily, Disp: , Rfl:     acetaminophen (TYLENOL) 500 MG tablet, Take 500 mg by mouth every 6 hours as needed, Disp: , Rfl:     aspirin 81 MG EC tablet, Take 81 mg by mouth, Disp: , Rfl:     citalopram (CELEXA) 20 MG tablet, TAKE 1.5 TABLETS (30 MG) BY MOUTH NIGHTLY, Disp: , Rfl:     Cyanocobalamin 1000 MCG SUBL, Place 500 mcg under the tongue every morning, Disp: , Rfl:     famotidine (PEPCID) 20 MG tablet, TAKE 1 TABLET BY MOUTH EVERY DAY IN THE EVENING, Disp: , Rfl:     ibuprofen (ADVIL;MOTRIN) 200 MG tablet, Take 400 mg by mouth as needed, Disp: , Rfl:     ketoconazole (NIZORAL) 2 % cream, APPLY TO AFFECTED AREAS ON THE FACE TWICE DAILY. , Disp: , Rfl:     levothyroxine (SYNTHROID) 50 MCG tablet, TAKE 1 TABLET (50 MCG) BY MOUTH DAILY, Disp: , Rfl:     lisinopril (PRINIVIL;ZESTRIL) 20 MG tablet, , Disp: , Rfl:     lovastatin (MEVACOR) 10 MG tablet, TAKE 1 TABLET (10 MG) BY MOUTH DAILY. , Disp: , Rfl:     mometasone (ELOCON) 0.1 % cream, APPLY TWICE DAILY TO RASH X 2 WKS THEN AS NEEDED WHEN FLARED, Disp: , Rfl:     pantoprazole (PROTONIX) 40 MG tablet, TAKE 1 TABLET BY MOUTH EVERY MORNING., Disp: , Rfl:     polyethylene glycol (GLYCOLAX) 17 GM/SCOOP powder, Take by mouth as needed, Disp: , Rfl:     Allergies: Allergies   Allergen Reactions    Adhesive Tape Itching     Other reaction(s): Itching-Allergy, Rash-Allergy  Sensitive skin   Does well with paper tape    Thick white silk tape is one that bothers her  Denies Latex allergy      Levofloxacin Other (See Comments)     Other reaction(s): Headache-Intolerance  After I.V. Infusion, sudden hot flushing with pressure in the head. \"Flushing\"      Penicillins Rash     Other reaction(s): Rash-Allergy    Tixocortol      Other reaction(s): Hives/Swelling-Allergy, Itching-Allergy         Physical Exam:     This is a well developed well nourished female adult in no acute distress. Mood and affect are appropriate. Oriented to person, place, and time. Respirations are unlabored and there is no evidence of cyanosis    Inspection of the back reveals no evidence of rash, such as zoster. Examination of the lumbar spine reveals no significantly imbalanced sagittal or coronal plane deformity. Flexion is limited by pain more so than extension. There is not significant tenderness to palpation along the spinous processes or paraspinal musculature. The patient ambulates with a mildly antalgic gait and crouched posture. Straight leg testing is negative. There is minimal hip irritability with internal or external rotation bilaterally.       Sensory testing reveals intact sensation to light touch and pin prick in the distribution of the L3-S1 dermatomes bilaterally, though there is subjective tingling over the buttocks and posterior thighs. .     Strength testing in the lower extremity reveals the following based on the 5 point grading scale:     HF (L2) H Ab (L5) KE (L3/4) ADF (L4) EHL (L5) A Ev (S1) APF (S1)   Right 5 5 5 5 5 5 5   Left 5 5 5 5 5 5 5       Radiographic Studies:     X-rays including AP and lateral views of the lumbar spine were reviewed and interpreted: There are multiple spondylotic changes noted including loss of disk height and osteophytes. No destructive lesion. Post laminectomy changes are noted L4-S1. L4-L5 spondylolisthesis is noted. Bone quality appears osteopenic. Radiographic Impression:    Lumbar spondylosis  Lumbar spondylolisthesis  Osteopenia    MRI of the lumbar spine images were reviewed and interpreted: She has previous laminectomy at L4-S1. There is spondylolisthesis at L4-L5 with bilateral foraminal stenosis. There is severe central stenosis at L3-L4 and moderate central stenosis at L1-L2 and L2-L3. Diagnosis:      ICD-10-CM    1. Spondylolisthesis of lumbar region  M43.16 XR LUMBAR SPINE (2-3 VIEWS)      2. Spinal stenosis of lumbar region with neurogenic claudication  M48.062       3. History of lumbar surgery  Z98.890           Assessment/Plan: This patient's clinical history and physical exam is consistent with neurogenic claudication which is likely due to lumbar stenosis with spondylolisthesis. At this point, she would like to discuss surgical options. I think we can stop the fusion at L3 because there is a completely collapsed disc space at L2-L3 and her spine becomes lordotic at that level thus decreasing the risk for postoperative decompensation at that level. But otherwise, the laminectomy needs to continue up to the L1 level. We discussed the details of surgery including a midline incision in over the low back followed by dissection to the area of stenosis. The nerves would be freed up by trimming any impinging structures including ligaments and bone.    Then any segments that are deemed to be unstable will be fused together with cadaver bone and screws and rods will supplement the fusion. A drain may be inserted and the wound would be closed with suture and covered with sterile dressings. The patient would expect to stay in the hospital 1-2 days or until she can get about safely with minimal assistance. A short stay in a rehabilitation facility could also be considered depending on how quickly she recovers. Follow-up would be scheduled for 2-3 weeks and she would have restrictions including no driving, and no lifting greater than 15 lbs until follow up with me. She was encouraged to walk as much as possible before and after the operation to facilitate an expeditious recovery. We also discussed the potential risks of the surgery including, but not limited to infection, spinal fluid leak and potential headaches requiring her to remain supine post-operatively; injury to the cauda equina or peripheral nerve root resulting in weakness, numbness, or very rarely bowel or bladder dysfunction; persistent back or leg symptoms, recurrence of stenosis or the development of instability or hardware failure possibly needing additional surgery;  blood loss needing transfusion; postoperative hematoma; and the risks of anesthesia. The patient voiced an understanding of these issues as outlined. The procedure that may prove to be beneficial here is a L1-L4 laminectomy and L3-L5 fusion with allograft, and instrumentation.           Electronically Signed By Jose Amezquita MD     11:39 AM

## 2022-09-27 DIAGNOSIS — M48.062 SPINAL STENOSIS OF LUMBAR REGION WITH NEUROGENIC CLAUDICATION: ICD-10-CM

## 2022-09-27 DIAGNOSIS — M43.16 SPONDYLOLISTHESIS OF LUMBAR REGION: Primary | ICD-10-CM

## 2022-09-28 ENCOUNTER — PREP FOR PROCEDURE (OUTPATIENT)
Dept: ORTHOPEDIC SURGERY | Age: 82
End: 2022-09-28

## 2022-11-09 ENCOUNTER — HOSPITAL ENCOUNTER (OUTPATIENT)
Dept: PREADMISSION TESTING | Age: 82
Discharge: HOME | End: 2022-11-12
Payer: MEDICARE

## 2022-11-09 VITALS
TEMPERATURE: 97.5 F | RESPIRATION RATE: 18 BRPM | HEART RATE: 59 BPM | BODY MASS INDEX: 30.78 KG/M2 | WEIGHT: 180.3 LBS | SYSTOLIC BLOOD PRESSURE: 138 MMHG | OXYGEN SATURATION: 98 % | DIASTOLIC BLOOD PRESSURE: 56 MMHG | HEIGHT: 64 IN

## 2022-11-09 LAB
BACTERIA SPEC CULT: NORMAL
SERVICE CMNT-IMP: NORMAL

## 2022-11-09 PROCEDURE — 87641 MR-STAPH DNA AMP PROBE: CPT

## 2022-11-09 RX ORDER — ACETAMINOPHEN 160 MG
TABLET,DISINTEGRATING ORAL DAILY
COMMUNITY

## 2022-11-09 ASSESSMENT — PAIN DESCRIPTION - DESCRIPTORS: DESCRIPTORS: ACHING

## 2022-11-09 ASSESSMENT — PAIN SCALES - GENERAL: PAINLEVEL_OUTOF10: 8

## 2022-11-09 ASSESSMENT — PAIN DESCRIPTION - FREQUENCY: FREQUENCY: INTERMITTENT

## 2022-11-09 ASSESSMENT — PAIN DESCRIPTION - ORIENTATION: ORIENTATION: LOWER

## 2022-11-09 ASSESSMENT — PAIN DESCRIPTION - LOCATION: LOCATION: BACK

## 2022-11-09 ASSESSMENT — PAIN - FUNCTIONAL ASSESSMENT: PAIN_FUNCTIONAL_ASSESSMENT: PREVENTS OR INTERFERES SOME ACTIVE ACTIVITIES AND ADLS

## 2022-11-09 NOTE — PRE-PROCEDURE INSTRUCTIONS
Patient verified name, , and surgery as listed in [facility]. Patient provided medical/health information and PTA medications to the best of their ability. TYPE  CASE: 3  Orders per surgeon: were received  Labs per surgeon: cbc and cmp results in care everywhere from 10/20/2022 mrsa/mssa collected. Labs per anesthesia protocol: no additional.  EKG:  not required per protocol     MRSA/MSSA swab collected; pharmacy to review and dose antibiotic as appropriate. Patient provided with and instructed on education handouts including Guide to Surgery, blood transfusions, pain management, and hand hygiene for the family and community, and [facility] brochure. Road to Recovery Spine surgery patient guide given. Instructed on incentive spirometry     Dyn-hex and instructions given per hospital policy. Original medication prescription bottles were visualized during patient appointment. Patient teach back successful and patient demonstrates knowledge of instruction. PLEASE CONTINUE TAKING ALL PRESCRIPTION MEDICATIONS UP TO THE DAY OF SURGERY UNLESS OTHERWISE DIRECTED BELOW. DISCONTINUE all vitamins and supplements 7 days prior to surgery. DISCONTINUE Non-Steriodal Anti-Inflammatory (NSAIDS) such as Advil and Aleve 5 days prior to surgery. Home Medications to take  the day of surgery   LEVOTHYROXINE   PANTOPRAZOLE   TAMOXIFEN     Home Medications   to Hold   HOLD LISINOPRIL MORNING OF SURGERY   HOLD ALL VITAMINS AND SUPPLEMENTS 7 DAYS PRIOR TO SURGERY    HOLD ADVIL 5 DAYS PRIOR TO SURGERY     Comments       On the day before surgery please take Acetaminophen (TYLENOL) 1000mg in the morning and then again before bed. On 11/15/2022          Please do not bring home medications with you on the day of surgery unless otherwise directed by your nurse.   If you are instructed to bring home medications, please give them to your nurse as they will be administered by the nursing staff.    If you have any questions, please call [facility] Radha Lester [phone] or [address] [place] [address] [phone]. A copy of this note was provided to the patient for reference.

## 2022-11-15 NOTE — PERIOP NOTE
Directly informed patient and or family member of pre op arrival time 0 on 11/16. All questions answered. Pre op instructions reviewed. Left contact information for any additional questions or needs.

## 2022-11-16 ENCOUNTER — APPOINTMENT (OUTPATIENT)
Dept: GENERAL RADIOLOGY | Age: 82
DRG: 455 | End: 2022-11-16
Attending: ORTHOPAEDIC SURGERY
Payer: MEDICARE

## 2022-11-16 ENCOUNTER — HOSPITAL ENCOUNTER (INPATIENT)
Age: 82
LOS: 3 days | Discharge: HOME HEALTH | DRG: 455 | End: 2022-11-19
Attending: ORTHOPAEDIC SURGERY | Admitting: ORTHOPAEDIC SURGERY
Payer: MEDICARE

## 2022-11-16 ENCOUNTER — ANESTHESIA EVENT (OUTPATIENT)
Dept: SURGERY | Age: 82
DRG: 455 | End: 2022-11-16
Payer: MEDICARE

## 2022-11-16 ENCOUNTER — ANESTHESIA (OUTPATIENT)
Dept: SURGERY | Age: 82
DRG: 455 | End: 2022-11-16
Payer: MEDICARE

## 2022-11-16 DIAGNOSIS — M48.062 SPINAL STENOSIS OF LUMBAR REGION WITH NEUROGENIC CLAUDICATION: ICD-10-CM

## 2022-11-16 DIAGNOSIS — Z98.1 S/P LUMBAR FUSION: Primary | ICD-10-CM

## 2022-11-16 DIAGNOSIS — M43.16 SPONDYLOLISTHESIS OF LUMBAR REGION: ICD-10-CM

## 2022-11-16 LAB
ABO + RH BLD: NORMAL
BLOOD GROUP ANTIBODIES SERPL: NORMAL
SPECIMEN EXP DATE BLD: NORMAL

## 2022-11-16 PROCEDURE — 22634 ARTHRD CMBN 1NTRSPC EA ADDL: CPT | Performed by: ORTHOPAEDIC SURGERY

## 2022-11-16 PROCEDURE — 6370000000 HC RX 637 (ALT 250 FOR IP): Performed by: ORTHOPAEDIC SURGERY

## 2022-11-16 PROCEDURE — 2720000010 HC SURG SUPPLY STERILE: Performed by: ORTHOPAEDIC SURGERY

## 2022-11-16 PROCEDURE — 01NB0ZZ RELEASE LUMBAR NERVE, OPEN APPROACH: ICD-10-PCS | Performed by: ORTHOPAEDIC SURGERY

## 2022-11-16 PROCEDURE — 22842 INSERT SPINE FIXATION DEVICE: CPT | Performed by: ORTHOPAEDIC SURGERY

## 2022-11-16 PROCEDURE — 6360000002 HC RX W HCPCS: Performed by: ANESTHESIOLOGY

## 2022-11-16 PROCEDURE — 7100000001 HC PACU RECOVERY - ADDTL 15 MIN: Performed by: ORTHOPAEDIC SURGERY

## 2022-11-16 PROCEDURE — 3700000000 HC ANESTHESIA ATTENDED CARE: Performed by: ORTHOPAEDIC SURGERY

## 2022-11-16 PROCEDURE — C1713 ANCHOR/SCREW BN/BN,TIS/BN: HCPCS | Performed by: ORTHOPAEDIC SURGERY

## 2022-11-16 PROCEDURE — 22614 ARTHRD PST TQ 1NTRSPC EA ADD: CPT | Performed by: ORTHOPAEDIC SURGERY

## 2022-11-16 PROCEDURE — 63053 LAM FACTC/FRMT ARTHRD LUM EA: CPT | Performed by: ORTHOPAEDIC SURGERY

## 2022-11-16 PROCEDURE — 2500000003 HC RX 250 WO HCPCS: Performed by: REGISTERED NURSE

## 2022-11-16 PROCEDURE — C1889 IMPLANT/INSERT DEVICE, NOC: HCPCS | Performed by: ORTHOPAEDIC SURGERY

## 2022-11-16 PROCEDURE — 3700000001 HC ADD 15 MINUTES (ANESTHESIA): Performed by: ORTHOPAEDIC SURGERY

## 2022-11-16 PROCEDURE — 7100000000 HC PACU RECOVERY - FIRST 15 MIN: Performed by: ORTHOPAEDIC SURGERY

## 2022-11-16 PROCEDURE — 0SG10AJ FUSION OF 2 OR MORE LUMBAR VERTEBRAL JOINTS WITH INTERBODY FUSION DEVICE, POSTERIOR APPROACH, ANTERIOR COLUMN, OPEN APPROACH: ICD-10-PCS | Performed by: ORTHOPAEDIC SURGERY

## 2022-11-16 PROCEDURE — 63048 LAM FACETEC &FORAMOT EA ADDL: CPT | Performed by: ORTHOPAEDIC SURGERY

## 2022-11-16 PROCEDURE — 6360000002 HC RX W HCPCS: Performed by: REGISTERED NURSE

## 2022-11-16 PROCEDURE — 22853 INSJ BIOMECHANICAL DEVICE: CPT | Performed by: ORTHOPAEDIC SURGERY

## 2022-11-16 PROCEDURE — 22633 ARTHRD CMBN 1NTRSPC LUMBAR: CPT | Performed by: ORTHOPAEDIC SURGERY

## 2022-11-16 PROCEDURE — 3600000004 HC SURGERY LEVEL 4 BASE: Performed by: ORTHOPAEDIC SURGERY

## 2022-11-16 PROCEDURE — 6360000002 HC RX W HCPCS: Performed by: ORTHOPAEDIC SURGERY

## 2022-11-16 PROCEDURE — 0SG00K1 FUSION OF LUMBAR VERTEBRAL JOINT WITH NONAUTOLOGOUS TISSUE SUBSTITUTE, POSTERIOR APPROACH, POSTERIOR COLUMN, OPEN APPROACH: ICD-10-PCS | Performed by: ORTHOPAEDIC SURGERY

## 2022-11-16 PROCEDURE — 2580000003 HC RX 258: Performed by: ORTHOPAEDIC SURGERY

## 2022-11-16 PROCEDURE — 2580000003 HC RX 258: Performed by: ANESTHESIOLOGY

## 2022-11-16 PROCEDURE — 0ST20ZZ RESECTION OF LUMBAR VERTEBRAL DISC, OPEN APPROACH: ICD-10-PCS | Performed by: ORTHOPAEDIC SURGERY

## 2022-11-16 PROCEDURE — 63047 LAM FACETEC & FORAMOT LUMBAR: CPT | Performed by: ORTHOPAEDIC SURGERY

## 2022-11-16 PROCEDURE — 86900 BLOOD TYPING SEROLOGIC ABO: CPT

## 2022-11-16 PROCEDURE — 00NY0ZZ RELEASE LUMBAR SPINAL CORD, OPEN APPROACH: ICD-10-PCS | Performed by: ORTHOPAEDIC SURGERY

## 2022-11-16 PROCEDURE — 20930 SP BONE ALGRFT MORSEL ADD-ON: CPT | Performed by: ORTHOPAEDIC SURGERY

## 2022-11-16 PROCEDURE — 2500000003 HC RX 250 WO HCPCS: Performed by: ORTHOPAEDIC SURGERY

## 2022-11-16 PROCEDURE — 3600000014 HC SURGERY LEVEL 4 ADDTL 15MIN: Performed by: ORTHOPAEDIC SURGERY

## 2022-11-16 PROCEDURE — 2709999900 HC NON-CHARGEABLE SUPPLY: Performed by: ORTHOPAEDIC SURGERY

## 2022-11-16 PROCEDURE — 72100 X-RAY EXAM L-S SPINE 2/3 VWS: CPT

## 2022-11-16 PROCEDURE — 6370000000 HC RX 637 (ALT 250 FOR IP): Performed by: ANESTHESIOLOGY

## 2022-11-16 PROCEDURE — 63052 LAM FACETC/FRMT ARTHRD LUM 1: CPT | Performed by: ORTHOPAEDIC SURGERY

## 2022-11-16 PROCEDURE — 1100000000 HC RM PRIVATE

## 2022-11-16 DEVICE — SPACER SPNL 15 DEG SM 28X10 MM STRL PROLIFT: Type: IMPLANTABLE DEVICE | Site: SPINE LUMBAR | Status: FUNCTIONAL

## 2022-11-16 DEVICE — POLYAXIAL CORTICAL SCREW
Type: IMPLANTABLE DEVICE | Site: SPINE LUMBAR | Status: FUNCTIONAL
Brand: XIA 4.5 SYSTEM -  XIA CT

## 2022-11-16 DEVICE — POLYAXIAL CORTICAL SCREW
Type: IMPLANTABLE DEVICE | Site: SPINE LUMBAR | Status: FUNCTIONAL
Brand: XIA 4.5 SPINAL SYSTEM

## 2022-11-16 DEVICE — VITALLIUM PREBENT AND PRECUT ROD WITH HEX
Type: IMPLANTABLE DEVICE | Site: SPINE LUMBAR | Status: FUNCTIONAL
Brand: XIA 4 5

## 2022-11-16 DEVICE — GRAFT BNE XL: Type: IMPLANTABLE DEVICE | Site: SPINE LUMBAR | Status: FUNCTIONAL

## 2022-11-16 DEVICE — ALLOGRAFT BNE CHIP 1-4 MM 15 CC CRUSH CANC: Type: IMPLANTABLE DEVICE | Site: SPINE LUMBAR | Status: FUNCTIONAL

## 2022-11-16 DEVICE — BIO DBM PLUS PUTTY (WITH CANCELLOUS)
Type: IMPLANTABLE DEVICE | Site: SPINE LUMBAR | Status: FUNCTIONAL
Brand: BIO DBM

## 2022-11-16 DEVICE — BLOCKER
Type: IMPLANTABLE DEVICE | Site: SPINE LUMBAR | Status: FUNCTIONAL
Brand: XIA 4.5 SYSTEM -  XIA CT

## 2022-11-16 RX ORDER — LIDOCAINE HYDROCHLORIDE 10 MG/ML
1 INJECTION, SOLUTION INFILTRATION; PERINEURAL
Status: DISCONTINUED | OUTPATIENT
Start: 2022-11-16 | End: 2022-11-16 | Stop reason: HOSPADM

## 2022-11-16 RX ORDER — OXYCODONE HYDROCHLORIDE 5 MG/1
10 TABLET ORAL EVERY 4 HOURS PRN
Status: DISCONTINUED | OUTPATIENT
Start: 2022-11-16 | End: 2022-11-19 | Stop reason: HOSPADM

## 2022-11-16 RX ORDER — LEVOTHYROXINE SODIUM 0.07 MG/1
75 TABLET ORAL DAILY
Status: DISCONTINUED | OUTPATIENT
Start: 2022-11-17 | End: 2022-11-19 | Stop reason: HOSPADM

## 2022-11-16 RX ORDER — EPHEDRINE SULFATE/0.9% NACL/PF 50 MG/5 ML
SYRINGE (ML) INTRAVENOUS PRN
Status: DISCONTINUED | OUTPATIENT
Start: 2022-11-16 | End: 2022-11-16 | Stop reason: SDUPTHER

## 2022-11-16 RX ORDER — APREPITANT 40 MG/1
40 CAPSULE ORAL ONCE
Status: COMPLETED | OUTPATIENT
Start: 2022-11-16 | End: 2022-11-16

## 2022-11-16 RX ORDER — MIDAZOLAM HYDROCHLORIDE 2 MG/2ML
2 INJECTION, SOLUTION INTRAMUSCULAR; INTRAVENOUS
Status: DISCONTINUED | OUTPATIENT
Start: 2022-11-16 | End: 2022-11-16 | Stop reason: HOSPADM

## 2022-11-16 RX ORDER — MORPHINE SULFATE 4 MG/ML
4 INJECTION INTRAVENOUS
Status: DISCONTINUED | OUTPATIENT
Start: 2022-11-16 | End: 2022-11-19 | Stop reason: HOSPADM

## 2022-11-16 RX ORDER — ACETAMINOPHEN 500 MG
1000 TABLET ORAL ONCE
Status: COMPLETED | OUTPATIENT
Start: 2022-11-16 | End: 2022-11-16

## 2022-11-16 RX ORDER — IPRATROPIUM BROMIDE AND ALBUTEROL SULFATE 2.5; .5 MG/3ML; MG/3ML
1 SOLUTION RESPIRATORY (INHALATION)
Status: DISCONTINUED | OUTPATIENT
Start: 2022-11-16 | End: 2022-11-16 | Stop reason: HOSPADM

## 2022-11-16 RX ORDER — SODIUM CHLORIDE 9 MG/ML
INJECTION, SOLUTION INTRAVENOUS PRN
Status: DISCONTINUED | OUTPATIENT
Start: 2022-11-16 | End: 2022-11-16 | Stop reason: HOSPADM

## 2022-11-16 RX ORDER — HYDROMORPHONE HCL 110MG/55ML
PATIENT CONTROLLED ANALGESIA SYRINGE INTRAVENOUS PRN
Status: DISCONTINUED | OUTPATIENT
Start: 2022-11-16 | End: 2022-11-16 | Stop reason: SDUPTHER

## 2022-11-16 RX ORDER — HYDROMORPHONE HYDROCHLORIDE 2 MG/ML
0.5 INJECTION, SOLUTION INTRAMUSCULAR; INTRAVENOUS; SUBCUTANEOUS EVERY 5 MIN PRN
Status: DISCONTINUED | OUTPATIENT
Start: 2022-11-16 | End: 2022-11-16 | Stop reason: HOSPADM

## 2022-11-16 RX ORDER — ONDANSETRON 2 MG/ML
4 INJECTION INTRAMUSCULAR; INTRAVENOUS EVERY 6 HOURS PRN
Status: DISCONTINUED | OUTPATIENT
Start: 2022-11-16 | End: 2022-11-19 | Stop reason: HOSPADM

## 2022-11-16 RX ORDER — DEXAMETHASONE SODIUM PHOSPHATE 4 MG/ML
INJECTION, SOLUTION INTRA-ARTICULAR; INTRALESIONAL; INTRAMUSCULAR; INTRAVENOUS; SOFT TISSUE PRN
Status: DISCONTINUED | OUTPATIENT
Start: 2022-11-16 | End: 2022-11-16 | Stop reason: SDUPTHER

## 2022-11-16 RX ORDER — LIDOCAINE HYDROCHLORIDE 20 MG/ML
INJECTION, SOLUTION EPIDURAL; INFILTRATION; INTRACAUDAL; PERINEURAL PRN
Status: DISCONTINUED | OUTPATIENT
Start: 2022-11-16 | End: 2022-11-16 | Stop reason: SDUPTHER

## 2022-11-16 RX ORDER — VANCOMYCIN HYDROCHLORIDE 1 G/20ML
INJECTION, POWDER, LYOPHILIZED, FOR SOLUTION INTRAVENOUS PRN
Status: DISCONTINUED | OUTPATIENT
Start: 2022-11-16 | End: 2022-11-16 | Stop reason: HOSPADM

## 2022-11-16 RX ORDER — SODIUM CHLORIDE 0.9 % (FLUSH) 0.9 %
5-40 SYRINGE (ML) INJECTION EVERY 12 HOURS SCHEDULED
Status: DISCONTINUED | OUTPATIENT
Start: 2022-11-16 | End: 2022-11-16 | Stop reason: HOSPADM

## 2022-11-16 RX ORDER — PROPOFOL 10 MG/ML
INJECTION, EMULSION INTRAVENOUS PRN
Status: DISCONTINUED | OUTPATIENT
Start: 2022-11-16 | End: 2022-11-16 | Stop reason: SDUPTHER

## 2022-11-16 RX ORDER — NEOSTIGMINE METHYLSULFATE 1 MG/ML
INJECTION, SOLUTION INTRAVENOUS PRN
Status: DISCONTINUED | OUTPATIENT
Start: 2022-11-16 | End: 2022-11-16 | Stop reason: SDUPTHER

## 2022-11-16 RX ORDER — CYCLOBENZAPRINE HCL 10 MG
10 TABLET ORAL 3 TIMES DAILY PRN
Status: DISCONTINUED | OUTPATIENT
Start: 2022-11-16 | End: 2022-11-19 | Stop reason: HOSPADM

## 2022-11-16 RX ORDER — TAMOXIFEN CITRATE 10 MG/1
20 TABLET ORAL DAILY
Status: DISCONTINUED | OUTPATIENT
Start: 2022-11-16 | End: 2022-11-19 | Stop reason: HOSPADM

## 2022-11-16 RX ORDER — TRANEXAMIC ACID 650 MG/1
1300 TABLET ORAL ONCE
Status: COMPLETED | OUTPATIENT
Start: 2022-11-16 | End: 2022-11-16

## 2022-11-16 RX ORDER — PROMETHAZINE HYDROCHLORIDE 12.5 MG/1
12.5 TABLET ORAL EVERY 6 HOURS PRN
Status: DISCONTINUED | OUTPATIENT
Start: 2022-11-16 | End: 2022-11-19 | Stop reason: HOSPADM

## 2022-11-16 RX ORDER — OXYCODONE HYDROCHLORIDE AND ACETAMINOPHEN 5; 325 MG/1; MG/1
1 TABLET ORAL EVERY 6 HOURS PRN
Qty: 28 TABLET | Refills: 0 | Status: SHIPPED | OUTPATIENT
Start: 2022-11-16 | End: 2022-11-23

## 2022-11-16 RX ORDER — CITALOPRAM 20 MG/1
30 TABLET ORAL DAILY
Status: DISCONTINUED | OUTPATIENT
Start: 2022-11-16 | End: 2022-11-19 | Stop reason: HOSPADM

## 2022-11-16 RX ORDER — SODIUM CHLORIDE 9 MG/ML
INJECTION, SOLUTION INTRAVENOUS PRN
Status: DISCONTINUED | OUTPATIENT
Start: 2022-11-16 | End: 2022-11-19 | Stop reason: HOSPADM

## 2022-11-16 RX ORDER — SODIUM CHLORIDE 0.9 % (FLUSH) 0.9 %
5-40 SYRINGE (ML) INJECTION PRN
Status: DISCONTINUED | OUTPATIENT
Start: 2022-11-16 | End: 2022-11-19 | Stop reason: HOSPADM

## 2022-11-16 RX ORDER — OXYCODONE HYDROCHLORIDE 5 MG/1
5 TABLET ORAL
Status: DISCONTINUED | OUTPATIENT
Start: 2022-11-16 | End: 2022-11-16 | Stop reason: HOSPADM

## 2022-11-16 RX ORDER — PROCHLORPERAZINE EDISYLATE 5 MG/ML
5 INJECTION INTRAMUSCULAR; INTRAVENOUS
Status: DISCONTINUED | OUTPATIENT
Start: 2022-11-16 | End: 2022-11-16 | Stop reason: HOSPADM

## 2022-11-16 RX ORDER — DIPHENHYDRAMINE HCL 25 MG
25 CAPSULE ORAL EVERY 6 HOURS PRN
Status: DISCONTINUED | OUTPATIENT
Start: 2022-11-16 | End: 2022-11-19 | Stop reason: HOSPADM

## 2022-11-16 RX ORDER — ATORVASTATIN CALCIUM 10 MG/1
10 TABLET, FILM COATED ORAL NIGHTLY
Status: DISCONTINUED | OUTPATIENT
Start: 2022-11-16 | End: 2022-11-19 | Stop reason: HOSPADM

## 2022-11-16 RX ORDER — ONDANSETRON 2 MG/ML
INJECTION INTRAMUSCULAR; INTRAVENOUS PRN
Status: DISCONTINUED | OUTPATIENT
Start: 2022-11-16 | End: 2022-11-16 | Stop reason: SDUPTHER

## 2022-11-16 RX ORDER — ROCURONIUM BROMIDE 10 MG/ML
INJECTION, SOLUTION INTRAVENOUS PRN
Status: DISCONTINUED | OUTPATIENT
Start: 2022-11-16 | End: 2022-11-16 | Stop reason: SDUPTHER

## 2022-11-16 RX ORDER — HALOPERIDOL 5 MG/ML
1 INJECTION INTRAMUSCULAR
Status: DISCONTINUED | OUTPATIENT
Start: 2022-11-16 | End: 2022-11-16 | Stop reason: HOSPADM

## 2022-11-16 RX ORDER — GLYCOPYRROLATE 0.2 MG/ML
INJECTION INTRAMUSCULAR; INTRAVENOUS PRN
Status: DISCONTINUED | OUTPATIENT
Start: 2022-11-16 | End: 2022-11-16 | Stop reason: SDUPTHER

## 2022-11-16 RX ORDER — OXYCODONE HYDROCHLORIDE 5 MG/1
5 TABLET ORAL EVERY 4 HOURS PRN
Status: DISCONTINUED | OUTPATIENT
Start: 2022-11-16 | End: 2022-11-19 | Stop reason: HOSPADM

## 2022-11-16 RX ORDER — SODIUM CHLORIDE 0.9 % (FLUSH) 0.9 %
5-40 SYRINGE (ML) INJECTION PRN
Status: DISCONTINUED | OUTPATIENT
Start: 2022-11-16 | End: 2022-11-16 | Stop reason: HOSPADM

## 2022-11-16 RX ORDER — SODIUM CHLORIDE 9 MG/ML
INJECTION, SOLUTION INTRAVENOUS CONTINUOUS
Status: ACTIVE | OUTPATIENT
Start: 2022-11-16 | End: 2022-11-17

## 2022-11-16 RX ORDER — MORPHINE SULFATE 2 MG/ML
2 INJECTION, SOLUTION INTRAMUSCULAR; INTRAVENOUS
Status: DISCONTINUED | OUTPATIENT
Start: 2022-11-16 | End: 2022-11-19 | Stop reason: HOSPADM

## 2022-11-16 RX ORDER — KETAMINE HCL IN NACL, ISO-OSM 20 MG/2 ML
SYRINGE (ML) INJECTION PRN
Status: DISCONTINUED | OUTPATIENT
Start: 2022-11-16 | End: 2022-11-16 | Stop reason: SDUPTHER

## 2022-11-16 RX ORDER — FENTANYL CITRATE 50 UG/ML
100 INJECTION, SOLUTION INTRAMUSCULAR; INTRAVENOUS
Status: DISCONTINUED | OUTPATIENT
Start: 2022-11-16 | End: 2022-11-16 | Stop reason: HOSPADM

## 2022-11-16 RX ORDER — SODIUM CHLORIDE 0.9 % (FLUSH) 0.9 %
5-40 SYRINGE (ML) INJECTION EVERY 12 HOURS SCHEDULED
Status: DISCONTINUED | OUTPATIENT
Start: 2022-11-16 | End: 2022-11-19 | Stop reason: HOSPADM

## 2022-11-16 RX ORDER — SODIUM CHLORIDE, SODIUM LACTATE, POTASSIUM CHLORIDE, CALCIUM CHLORIDE 600; 310; 30; 20 MG/100ML; MG/100ML; MG/100ML; MG/100ML
INJECTION, SOLUTION INTRAVENOUS CONTINUOUS
Status: DISCONTINUED | OUTPATIENT
Start: 2022-11-16 | End: 2022-11-16 | Stop reason: HOSPADM

## 2022-11-16 RX ORDER — ACETAMINOPHEN 325 MG/1
650 TABLET ORAL EVERY 6 HOURS
Status: DISCONTINUED | OUTPATIENT
Start: 2022-11-16 | End: 2022-11-19 | Stop reason: HOSPADM

## 2022-11-16 RX ORDER — DIPHENHYDRAMINE HYDROCHLORIDE 50 MG/ML
25 INJECTION INTRAMUSCULAR; INTRAVENOUS EVERY 6 HOURS PRN
Status: DISCONTINUED | OUTPATIENT
Start: 2022-11-16 | End: 2022-11-19 | Stop reason: HOSPADM

## 2022-11-16 RX ADMIN — FENTANYL CITRATE 25 MCG: 50 INJECTION INTRAMUSCULAR; INTRAVENOUS at 07:42

## 2022-11-16 RX ADMIN — Medication 5 MG: at 08:29

## 2022-11-16 RX ADMIN — PROPOFOL 100 MG: 10 INJECTION, EMULSION INTRAVENOUS at 07:14

## 2022-11-16 RX ADMIN — CITALOPRAM HYDROBROMIDE 30 MG: 20 TABLET ORAL at 18:03

## 2022-11-16 RX ADMIN — HYDROMORPHONE HYDROCHLORIDE 0.5 MG: 2 INJECTION, SOLUTION INTRAMUSCULAR; INTRAVENOUS; SUBCUTANEOUS at 11:52

## 2022-11-16 RX ADMIN — ROCURONIUM BROMIDE 10 MG: 50 INJECTION, SOLUTION INTRAVENOUS at 09:20

## 2022-11-16 RX ADMIN — GLYCOPYRROLATE 0.2 MG: 0.2 INJECTION, SOLUTION INTRAMUSCULAR; INTRAVENOUS at 07:51

## 2022-11-16 RX ADMIN — OXYCODONE 10 MG: 5 TABLET ORAL at 18:02

## 2022-11-16 RX ADMIN — ACETAMINOPHEN 650 MG: 325 TABLET ORAL at 18:01

## 2022-11-16 RX ADMIN — Medication 10 MG: at 08:15

## 2022-11-16 RX ADMIN — SODIUM CHLORIDE: 9 INJECTION, SOLUTION INTRAVENOUS at 18:07

## 2022-11-16 RX ADMIN — SODIUM CHLORIDE, PRESERVATIVE FREE 5 ML: 5 INJECTION INTRAVENOUS at 21:23

## 2022-11-16 RX ADMIN — SODIUM CHLORIDE, SODIUM LACTATE, POTASSIUM CHLORIDE, AND CALCIUM CHLORIDE: 600; 310; 30; 20 INJECTION, SOLUTION INTRAVENOUS at 09:00

## 2022-11-16 RX ADMIN — ACETAMINOPHEN 1000 MG: 500 TABLET ORAL at 06:55

## 2022-11-16 RX ADMIN — ACETAMINOPHEN 650 MG: 325 TABLET ORAL at 23:46

## 2022-11-16 RX ADMIN — ROCURONIUM BROMIDE 10 MG: 50 INJECTION, SOLUTION INTRAVENOUS at 08:49

## 2022-11-16 RX ADMIN — TRANEXAMIC ACID 1300 MG: 650 TABLET ORAL at 06:55

## 2022-11-16 RX ADMIN — FENTANYL CITRATE 75 MCG: 50 INJECTION INTRAMUSCULAR; INTRAVENOUS at 07:12

## 2022-11-16 RX ADMIN — APREPITANT 40 MG: 40 CAPSULE ORAL at 06:55

## 2022-11-16 RX ADMIN — PROPOFOL 70 MG: 10 INJECTION, EMULSION INTRAVENOUS at 08:49

## 2022-11-16 RX ADMIN — HYDROMORPHONE HYDROCHLORIDE 0.5 MG: 2 INJECTION, SOLUTION INTRAMUSCULAR; INTRAVENOUS; SUBCUTANEOUS at 12:13

## 2022-11-16 RX ADMIN — Medication 20 MG: at 07:13

## 2022-11-16 RX ADMIN — ATORVASTATIN CALCIUM 10 MG: 10 TABLET, FILM COATED ORAL at 21:23

## 2022-11-16 RX ADMIN — Medication 2 G: at 07:28

## 2022-11-16 RX ADMIN — Medication 3 MG: at 10:11

## 2022-11-16 RX ADMIN — ONDANSETRON 4 MG: 2 INJECTION INTRAMUSCULAR; INTRAVENOUS at 07:35

## 2022-11-16 RX ADMIN — LIDOCAINE HYDROCHLORIDE 70 MG: 20 INJECTION, SOLUTION EPIDURAL; INFILTRATION; INTRACAUDAL; PERINEURAL at 07:14

## 2022-11-16 RX ADMIN — CEFAZOLIN SODIUM 2000 MG: 100 INJECTION, POWDER, LYOPHILIZED, FOR SOLUTION INTRAVENOUS at 18:04

## 2022-11-16 RX ADMIN — PROPOFOL 30 MG: 10 INJECTION, EMULSION INTRAVENOUS at 07:42

## 2022-11-16 RX ADMIN — CYCLOBENZAPRINE 10 MG: 10 TABLET, FILM COATED ORAL at 18:03

## 2022-11-16 RX ADMIN — Medication 10 MG: at 09:15

## 2022-11-16 RX ADMIN — HYDROMORPHONE HYDROCHLORIDE 0.4 MG: 2 INJECTION INTRAMUSCULAR; INTRAVENOUS; SUBCUTANEOUS at 10:15

## 2022-11-16 RX ADMIN — DEXAMETHASONE SODIUM PHOSPHATE 4 MG: 4 INJECTION, SOLUTION INTRAMUSCULAR; INTRAVENOUS at 07:35

## 2022-11-16 RX ADMIN — GLYCOPYRROLATE 0.6 MG: 0.2 INJECTION, SOLUTION INTRAMUSCULAR; INTRAVENOUS at 10:11

## 2022-11-16 RX ADMIN — ROCURONIUM BROMIDE 50 MG: 50 INJECTION, SOLUTION INTRAVENOUS at 07:15

## 2022-11-16 RX ADMIN — Medication 3 AMPULE: at 07:00

## 2022-11-16 RX ADMIN — SODIUM CHLORIDE, SODIUM LACTATE, POTASSIUM CHLORIDE, AND CALCIUM CHLORIDE: 600; 310; 30; 20 INJECTION, SOLUTION INTRAVENOUS at 06:56

## 2022-11-16 ASSESSMENT — PAIN SCALES - GENERAL
PAINLEVEL_OUTOF10: 7
PAINLEVEL_OUTOF10: 10
PAINLEVEL_OUTOF10: 9
PAINLEVEL_OUTOF10: 0

## 2022-11-16 ASSESSMENT — PAIN DESCRIPTION - LOCATION
LOCATION: BACK

## 2022-11-16 ASSESSMENT — PAIN - FUNCTIONAL ASSESSMENT: PAIN_FUNCTIONAL_ASSESSMENT: 0-10

## 2022-11-16 ASSESSMENT — PAIN DESCRIPTION - DESCRIPTORS: DESCRIPTORS: ACHING

## 2022-11-16 NOTE — PERIOP NOTE
TRANSFER - OUT REPORT:    Verbal report given to Keon Gibson RN on Dion Camilo  being transferred to  for routine progression of patient care       Report consisted of patients Situation, Background, Assessment and   Recommendations(SBAR). Information from the following report(s) Nurse Handoff Report and Surgery Report was reviewed with the receiving nurse. Lines:   Peripheral IV 11/16/22 Left;Posterior Hand (Active)   Site Assessment Clean, dry & intact 11/16/22 6061   Line Status Blood return noted; Infusing 11/16/22 0632   Phlebitis Assessment No symptoms 11/16/22 0632   Infiltration Assessment 0 11/16/22 0632   Alcohol Cap Used No 11/16/22 0632   Dressing Status New dressing applied;Clean, dry & intact 11/16/22 0407   Dressing Type Transparent 11/16/22 0632   Dressing Intervention New 11/16/22 3269        Opportunity for questions and clarification was provided. Patient transported with:   O2 @ 2 liters  Tech    VTE prophylaxis orders have been written for Dion Camilo. Patient and family given floor number and nurses name. Family updated re: pt status after security code verified.

## 2022-11-16 NOTE — ANESTHESIA PRE PROCEDURE
[facility]) 17 GM/SCOOP powder Take by mouth as needed    Historical Provider, MD   tamoxifen (NOLVADEX) 20 MG tablet Take 20 mg by mouth daily 9/28/21   Historical Provider, MD       Current medications:    Current Facility-Administered Medications   Medication Dose Route Frequency Provider Last Rate Last Admin    lidocaine 1 % injection 1 mL  1 mL IntraDERmal Once PRN Elizabeth Moreau MD        acetaminophen (TYLENOL) tablet 1,000 mg  1,000 mg Oral Once Elizabeth Moreau MD        fentaNYL (SUBLIMAZE) injection 100 mcg  100 mcg IntraVENous Once PRN Elizabeth Moreau MD        lactated ringers infusion   IntraVENous Continuous Elizabeth Moreau MD        sodium chloride flush 0.9 % injection 5-40 mL  5-40 mL IntraVENous 2 times per day Elizabeth Moreau MD        sodium chloride flush 0.9 % injection 5-40 mL  5-40 mL IntraVENous PRN Elizabeth Moreau MD        0.9 % sodium chloride infusion   IntraVENous PRN Elizabeth Moreau MD        midazolam PF (VERSED) injection 2 mg  2 mg IntraVENous Once PRN Elizabeth Moreau MD        tranexamic acid (LYSTEDA) tablet 1,300 mg  1,300 mg Oral Once Elizabeth Moreau MD        ceFAZolin (ANCEF) 2000 mg in sterile water 20 mL IV syringe  2,000 mg IntraVENous On Call to Arie Ragsdale MD        aprepitant (EMEND) capsule 40 mg  40 mg Oral Once Elizabeth Moreau MD           Allergies: Allergies   Allergen Reactions    Adhesive Tape Itching     Other reaction(s): Itching-Allergy, Rash-Allergy  Sensitive skin   Does well with paper tape    Thick white silk tape is one that bothers her  Denies Latex allergy      Levofloxacin Other (See Comments)     Other reaction(s): Headache-Intolerance  After I.V. Infusion, sudden hot flushing with pressure in the head.    \"Flushing\"      Penicillins Rash     Other reaction(s): Rash-Allergy    Tixocortol      Other reaction(s): Hives/Swelling-Allergy, Itching-Allergy       Problem List:    Patient Active Problem List   Diagnosis Code    Melanoma ([facility] C43.9    Spondylolisthesis of lumbar region M43.16    Spinal stenosis of lumbar region with neurogenic claudication M48.062       Past Medical History:        Diagnosis Date    Arthritis     osteo    [facility]     Breast    GERD (gastroesophageal reflux disease)     History of left breast cancer     Lumpectomy    Hyperlipidemia     Hyperparathyroidism ([facility]     Hypertension     IBS (irritable bowel syndrome)     PONV (postoperative nausea and vomiting)     Thyroid disease        Past Surgical History:        Procedure Laterality Date    COLONOSCOPY      ROTATOR CUFF REPAIR Bilateral     at different time    SHOULDER ARTHROPLASTY Right     SKIN CANCER EXCISION      melonoma removed from back, and arm    TOTAL KNEE ARTHROPLASTY Bilateral     at different times       Social History:    Social History     Tobacco Use    Smoking status: Never    Smokeless tobacco: Never   Substance Use Topics    Alcohol use: Not on file     Comment: rarely                                Counseling given: Not Answered      Vital Signs (Current):   Vitals:    11/16/22 0551   BP: (!) 168/72   Pulse: 63   Resp: 16   Temp: (!) 96.5 °F (35.8 °C)   TempSrc: Tympanic   SpO2: 100%   Weight: 180 lb (81.6 kg)   Height: 5' 4\" (1.626 m)                                              BP Readings from Last 3 Encounters:   11/16/22 (!) 168/72   11/09/22 (!) 138/56       NPO Status: Time of last liquid consumption: 1900                        Time of last solid consumption: 1900                        Date of last liquid consumption: 11/15/22                        Date of last solid food consumption: 11/15/22    BMI:   Wt Readings from Last 3 Encounters:   11/16/22 180 lb (81.6 kg)   11/09/22 180 lb 4.8 oz (81.8 kg)   09/12/22 181 lb (82.1 kg)     Body mass index is 30.9 kg/m².     CBC: No results found for: WBC, RBC, HGB, HCT, MCV, RDW, PLT    CMP: No results found for: NA, K, CL, CO2, BUN, CREATININE, GFRAA, AGRATIO, LABGLOM, GLUCOSE, GLU, PROT, CALCIUM, BILITOT, ALKPHOS, AST, ALT    POC Tests: No results for input(s): POCGLU, POCNA, POCK, POCCL, POCBUN, POCHEMO, POCHCT in the last 72 hours. Coags: No results found for: PROTIME, INR, APTT    HCG (If Applicable): No results found for: PREGTESTUR, PREGSERUM, HCG, HCGQUANT     ABGs: No results found for: PHART, PO2ART, RFY5LAU, ZKV4UWM, BEART, Z9YBNDVJ     Type & Screen (If Applicable):  No results found for: LABABO, LABRH    Drug/Infectious Status (If Applicable):  No results found for: HIV, HEPCAB    COVID-19 Screening (If Applicable): No results found for: COVID19        Anesthesia Evaluation  Patient summary reviewed and Nursing notes reviewed   history of anesthetic complications: PONV. Airway: Mallampati: II  TM distance: >3 FB   Neck ROM: full  Mouth opening: > = 3 FB   Dental: normal exam         Pulmonary:Negative Pulmonary ROS breath sounds clear to auscultation                             Cardiovascular:  Exercise tolerance: good (>4 METS),   (+) hypertension:, hyperlipidemia              Stress test reviewed             ROS comment: Stress Oct. '20- normal EF, normal perfusion     Neuro/Psych:   (+) depression/anxiety             GI/Hepatic/Renal:   (+) GERD: well controlled,          ROS comment: IBS. Endo/Other:    (+) hypothyroidism::., .                 Abdominal:             Vascular: negative vascular ROS. Other Findings:           Anesthesia Plan      general     ASA 2     (Plan Multimodal analgesia  PO TXA given in pre-op  Add emend for PONV hx)  Induction: intravenous. Anesthetic plan and risks discussed with patient and child/children.                         Sanjay Velásquez MD   11/16/2022

## 2022-11-16 NOTE — ANESTHESIA PROCEDURE NOTES
Airway  Date/Time: 11/16/2022 7:20 AM  Urgency: elective    Airway not difficult    General Information and Staff    Patient location during procedure: OR  Anesthesiologist: Bryn Mckeon MD  Performed: anesthesiologist     Indications and Patient Condition  Indications for airway management: anesthesia  Spontaneous Ventilation: absent  Sedation level: deep  Preoxygenated: yes  Patient position: sniffing  Mask difficulty assessment: vent by bag mask    Final Airway Details  Final airway type: endotracheal airway      Successful airway: ETT  Cuffed: yes   Successful intubation technique: direct laryngoscopy  Facilitating devices/methods: intubating stylet  Endotracheal tube insertion site: oral  Blade: Bertrand  Blade size: #4  ETT size (mm): 7.0  Cormack-Lehane Classification: grade IIa - partial view of glottis  Placement verified by: chest auscultation and capnometry   Measured from: lips  ETT to lips (cm): 23  Number of attempts at approach: 2  Ventilation between attempts: bag mask  Number of other approaches attempted: 0    Additional Comments  PreO2 > 5 minutes. Standard IV induction by MDA. DL x 1 by CRNA. Grade 2 view. Esophageal intubation. DL x 1 by MDA. Atraumatic insertion. Positive equal and bilateral breath sounds noted with positive ETCO2 present. Lips and dentition unchanged from pre-op.     no

## 2022-11-16 NOTE — ANESTHESIA POSTPROCEDURE EVALUATION
Department of Anesthesiology  Postprocedure Note    Patient: Richie Gentile  MRN: [id number]  YOB: 1940  Date of evaluation: 11/16/2022      Procedure Summary     Date: 11/16/22 Room / Location: [facility] MAIN OR  / [facility] MAIN OR    Anesthesia Start: 0701 Anesthesia Stop: 7104    Procedure: L1-L5 LAMINECTOMY and L2-L5 FUSION with allograft and instrumentation (Spine Lumbar) Diagnosis:       Spondylolisthesis of lumbar region      Spinal stenosis of lumbar region with neurogenic claudication      (Spondylolisthesis of lumbar region [M43.16])      (Spinal stenosis of lumbar region with neurogenic claudication [M48.062])    Providers: Malachi Wing MD Responsible Provider: Shilpa Stein MD    Anesthesia Type: General ASA Status: 2          Anesthesia Type: General    Chace Phase I: Chace Score: 9    Chace Phase II:        Anesthesia Post Evaluation    Patient location during evaluation: PACU  Patient participation: complete - patient participated  Level of consciousness: awake and alert  Pain score: 2  Airway patency: patent  Nausea & Vomiting: no nausea and no vomiting  Complications: no  Cardiovascular status: blood pressure returned to baseline  Respiratory status: acceptable  Hydration status: euvolemic  Comments: BP (!) 124/57   Pulse 59   Temp 98.1 °F (36.7 °C) (Skin)   Resp 16   Ht 5' 4\" (1.626 m)   Wt 180 lb (81.6 kg)   SpO2 96%   BMI 30.90 kg/m²   Multimodal analgesia pain management approach

## 2022-11-16 NOTE — H&P
Chief complaint: Back and buttock pain with activities. History of present illness: This is a very pleasant [de-identified] y.o. old female who presents with a longstanding history of low back pain with episodic radiation to the buttocks and lower extremities,  on the bilateral   side. The onset of the symptoms was rather insidious. The patient describes the quality of the pain as a deep ache. The patient has noticed a progressive decrease in her  ability to walk or stand for any extended period of time. Her  walking and standing pain is usually relieved with sitting. She  has noted that pushing a cart in the store seems to help. She has been dealing with urinary incontinence for quite some time now. This patient has had lumbar surgery in the past including a previous laminectomy. Despite her pain and debilitation, she continues to go to a senior fusion activity. She has also done physical therapy. She takes a regimen of ibuprofen daily. Physical Exam:      This is a well developed well nourished female adult in no acute distress. Oriented to person, place, and time. Respirations are unlabored and there is no evidence of cyanosis    Heart is regular rate and rhythm,       Sensory testing reveals intact sensation to light touch and pin prick in the distribution of the L3-S1 dermatomes bilaterally, though there is subjective tingling over the buttocks and posterior thighs. .      There is no focal motor deficit in the lower extremities. Radiographic Studies:      X-rays including AP and lateral views of the lumbar spine were reviewed and interpreted: There are multiple spondylotic changes noted including loss of disk height and osteophytes. No destructive lesion. Post laminectomy changes are noted L4-S1. L4-L5 spondylolisthesis is noted. Bone quality appears osteopenic. MRI of the lumbar spine images were reviewed and interpreted: She has previous laminectomy at L4-S1.   There is spondylolisthesis at L4-L5 with bilateral foraminal stenosis. There is severe central stenosis at L3-L4 and moderate central stenosis at L1-L2 and L2-L3. Diagnosis:         ICD-10-CM     1. Spondylolisthesis of lumbar region  M43.16 XR LUMBAR SPINE (2-3 VIEWS)       2. Spinal stenosis of lumbar region with neurogenic claudication  M48.062         3. History of lumbar surgery  Z98.890               Assessment/Plan: This patient's clinical history and physical exam is consistent with neurogenic claudication which is likely due to lumbar stenosis with spondylolisthesis. At this point, she would like to proceed with surgery. I think we can stop the fusion at L3 because there is a completely collapsed disc space at L2-L3 and her spine becomes lordotic at that level thus decreasing the risk for postoperative decompensation at that level. But otherwise, the laminectomy needs to continue up to the L1 level. We discussed the details of surgery. The procedure that may prove to be beneficial here is a L1-L4 laminectomy and L3-L5 fusion with allograft, and instrumentation.

## 2022-11-16 NOTE — OP NOTE
[address] [address]   [phone]    OPERATIVE REPORT    Patient ID:Polina Degroot  [id number]  1940  81 y.o. DATE OF SURGERY: 11/16/2022    SURGEON: Mere Prince MD    PREOP DIAGNOSIS:     1. Lumbar spondylolisthesis   2. Lumbar stenosis     POSTOP DIAGNOSIS:     1. Lumbar spondylolisthesis   2. Lumbar stenosis     PROCEDURE:  1. Posterolateral and transforaminal lumbar interbody fusion L3-L4 and L4-L5 including laminectomy at L3, L4, and L5 for stenosis (CPT 73548, 45131, 79463, 91164 X 2)  2. Insertion biomechanical device L3-L4 and L4-L5 (CPT 22853 X 2)  3. Lumbar laminectomy L1 and L2  with foraminotomies. (CPT Q057108, [id number])  4. Pedicle screw instrumentation  L2 through L5 . (CPT 04696)  5. Allograft (CPT 13307)  6. Posterolateral fusion L2-3 without interbody fusion (CPT 97489)     ANESTHESIA: General    ESTIMATED BLOOD LOSS:  800 ml    INTRAOPERATIVE COMPLICATIONS: None. POSTOP CONDITION: Stable. IMPLANTS:   Implant Name Type Inv.  Item Serial No.  Lot No. LRB No. Used Action   ALLOGRAFT BNE CHIP 1-4 MM 15 CC CRUSH [employer] - [id number]  ALLOGRAFT BNE CHIP 1-4 MM 15 CC CRUSH [employer] [id number] [employer] [facility]  N/A 1 Implanted   GRAFT BNE XL - [id number]  GRAFT BNE XL [id number] [employer]  N/A 1 Implanted   SERVICE FEE 10ML BIO DBM PTTY W CHIP - [id number]  SERVICE FEE 10ML BIO DBM PTTY W CHIP  [employer] [facility] [id number] N/A 1 Implanted   SERVICE FEE 10ML BIO DBM PTTY W CHIP - [id number]  SERVICE FEE 10ML BIO DBM PTTY W CHIP  [employer] [facility] [id number] N/A 1 Implanted   BLOCKER SPNL CT SAM 45 - [id number]  BLOCKER SPNL CT SAM 45  YANN SPINE [facility] [id number] N/A 8 Implanted   SCREW SPNL L30MM OD55MM POLYAX GABRIELA CT SAM - [id number]  SCREW SPNL L30MM OD55MM POLYAX GABRIELA CT SAM  YANN SPINE HOWM-WD [id number] N/A 2 Implanted   RADHA SPINE PREBENT W HEX VITALIUM [id number] SAM - [id number]  RADHA SPINE PREBENT W HEX VITALIUM [id number] SAM  YANN SPINE HOWM-WD [id number] N/A 2 Implanted   SCREW SPNL L25MM OD55MM POLYAX GABRIELA CT SAM - [id number]  SCREW SPNL L25MM OD55MM POLYAX GABRIELA CT SAM  YANN SPINE HOWM-WD [id number] N/A 2 Implanted   SCREW SPNL L25MM OD65MM POLYAX GABRIELA CT SAM - [id number]  SCREW SPNL L25MM OD65MM POLYAX GABRIELA CT SAM  YANN SPINE HOW-WD [id number] N/A 1 Implanted   SCREW SPNL L50MM OD65MM POLYAX GABRIELA CT SAM - [id number]  SCREW SPNL L50MM OD65MM POLYAX GABRIELA CT SAM  [employer] SPINE HOW-WD [id number] N/A 3 Implanted   SPACER SPNL 15 DEG SM 28X10 MM STRL PROLIFT - [id number]  SPACER SPNL 15 DEG SM 28X10 MM STRL PROLIFT  YANN SPINE HOW-WD [id number] N/A 2 Implanted       INDICATIONS FOR PROCEDURE: Patient has had low back pain with radiation to the buttocks and lower extremities for an extended period of time. The symptoms and exam findings were felt to be consistent with neurogenic claudication. The preoperative radiographs and other imaging confirmed showed spondylolisthesis and stenosis. Conservative measures have been exhausted as outlined in the H&P. The symptoms progressed to the point where there is difficulty performing any task that requires prolonged standing or walking which interfered with activities of daily living and ability to enjoy life. In the outpatient setting the risks, benefits and potential complications of the above-listed procedure were discussed with her and an informed consent was obtained. DESCRIPTION OF PROCEDURE: After adequate induction of general anesthesia, the patient was positioned prone on the Michael spinal table. Care was taken to pad all bony prominences. The shoulders and elbows were placed in the 90/90 position. The abdomen was allowed to hang free to decrease intraabdominal and venous pressure. The lumbar area was prepped and draped in the usual sterile fashion. Preoperative antibiotics were administered.  A time out was called to confirm appropriate patient, proposed procedure and proposed incision site. With this confirmation, an incision was created in the midline of the back over the lumbar spinous processes. Dissection was carried down through the skin and subcutaneous tissues to the level of the lumbodorsal fascia. The lumbar dorsal fascia was released off of the spinous processes. The paraspinous musculature was elevated in a subperiosteal fashion in a lateral direction off of the lamina and over the the facet joints to be fused. A curet was slipped beneath the lamina and a cross table flouroscopic image was obtained to identify appropriate spinal level. The L2 through L5 transverse processes were exposed to their lateral tips. All soft tissue was elevated off of the intertransverse membrane laterally in preparation for a fusion bed. With the posterior lateral dissection completed, attention was directed centrally where a Leksell rongeur was used to resect the remaining spinous processes of L3 through L5. The 4 mm radha was then used to thin the lamina to an egg shell like thickness. A triple-0 angled curet was used to elevate the scar off of its origin on the caudal surface of the L5 lamina. The was elevated from the thecal sac and a plane was created in the epidural space with a Bureau elevator. A 4 mm Kerrison was used to perform a revision laminectomy of L5 and this was carried through L3. The central laminectomy was widened to the medial border of the pedicle at each level. With the central laminectomy completed, a 4 mm Kerrison was used to undercut the lateral recess along the entire length of the laminectomy site. Then using the [facility] elevator to retract the thecal sac and identify each of the nerve roots, partial foraminotomies were performed and each nerve was visualized and decompressed bilaterally at L3, L4, L5. The alivia nerve root retractor was used to retrace the thecal sac overlying the L3 - L4  disc space. Care was taken to protect the exiting and descending nerve roots at all times. An annulotomy was then performed with a 15-blade. A complete discectomy was performed using a series of curets and rongeurs. The interbody sizing system was brought to the field and the sizing paddles were used sequentially to an appropriate fit. The endplates were prepared for fusion using the rasps. A trial interbody device was sized and inserted. Fluoroscopic images were obtained of the trial in both planes. The final interbody implant was the opened and packed with local autograft. Additional local bone graft was placed anteriorly in the interbody space. The biomechanical device was then impacted and rotated appropriately. Again fluoroscopy was ws used to confirm cage positioning. The alivia nerve root retractor was used to retrace the thecal sac overlying the L4 - L5  disc space. Care was taken to protect the exiting and descending nerve roots at all times. An annulotomy was then performed with a 15-blade. A complete discectomy was performed using a series of curets and rongeurs. The interbody sizing system was brought to the field and the sizing paddles were used sequentially to an appropriate fit. The endplates were prepared for fusion using the rasps. A trial interbody device was sized and inserted. Fluoroscopic images were obtained of the trial in both planes. The final interbody implant was the opened and packed with local autograft. Additional local bone graft was placed anteriorly in the interbody space. The biomechanical device was then impacted and rotated appropriately. Again fluoroscopy was ws used to confirm cage positioning. The 4 mm radha was used to decorticate the previously exposed transverse processes and lateral aspect of the facet joints and pars intra-articularis.    The Kina Kissousa spinal instrumentation system was brought to the field and using a free hand intersection technique, pedicle screws were placed bilaterally from L2 to L5. The medial border of the pedicle was visualized through the spinal canal to confirm no medial or inferior breech. This was felt to be satisfactory. At this point the Protios soaked allograft was then packed into the lateral gutters beneath the screw heads, along the decorticated transverse processes and lateral facet joints for the posterolateral arthrodesis at L2-3, L3-4, L4-5. Appropriately sized rods were then selected and bent into additional lordosis and laid into the pedicle screw heads. The set screws were then applied and tightened to the appropriate torque. C-arm fluoroscopy was brought in and used to obtain images to confirm appropriate hardware level and placement. This was felt to be satisfactory. A Leksell rongeur was used to resect the remaining spinous processes of L3 through L5. The 4 mm radha was then used to thin the lamina to an egg shell like thickness. A triple-0 angled curet was used to elevate the ligamentum flavum off of its origin on the caudal surface of the L5 lamina. The was elevated from the thecal sac and a plane was created in the epidural space with a Skandia elevator. A 4 mm Kerrison was used to perform a central laminectomy of L1 and this was carried through L2. The central laminectomy was widened to the medial border of the pedicle at each level. With the central laminectomy completed, a 4 mm Kerrison was used to undercut the lateral recess along the entire length of the laminectomy site. Then using the [facility] elevator to retract the thecal sac and identify each of the nerve roots, partial foraminotomies were performed and each nerve was visualized and decompressed bilaterally at L1 and L2. With this, the wound was liberally irrigated and a hemovac drain was inserted through a separate incision in a subfascial plane. The lumbodorsal fascia was approximated with a # 1 Vicryl suture in an interrupted fashion.  The subcutaneous tissue and skin were approximated in a layered fashion. Dermabond was applied. Sterile dressings were applied. The patient tolerated the procedure well and was returned to the postanesthesia care unit in stable condition. At the end of the case, all sponge, needle, and instrument counts were correct.       Marisela Gusman MD

## 2022-11-17 PROCEDURE — 97530 THERAPEUTIC ACTIVITIES: CPT

## 2022-11-17 PROCEDURE — 97165 OT EVAL LOW COMPLEX 30 MIN: CPT

## 2022-11-17 PROCEDURE — 2500000003 HC RX 250 WO HCPCS: Performed by: ORTHOPAEDIC SURGERY

## 2022-11-17 PROCEDURE — 97161 PT EVAL LOW COMPLEX 20 MIN: CPT

## 2022-11-17 PROCEDURE — 51798 US URINE CAPACITY MEASURE: CPT

## 2022-11-17 PROCEDURE — 6360000002 HC RX W HCPCS: Performed by: ORTHOPAEDIC SURGERY

## 2022-11-17 PROCEDURE — 6370000000 HC RX 637 (ALT 250 FOR IP): Performed by: ORTHOPAEDIC SURGERY

## 2022-11-17 PROCEDURE — 97535 SELF CARE MNGMENT TRAINING: CPT

## 2022-11-17 PROCEDURE — 2580000003 HC RX 258: Performed by: ORTHOPAEDIC SURGERY

## 2022-11-17 PROCEDURE — 1100000000 HC RM PRIVATE

## 2022-11-17 RX ADMIN — TAMOXIFEN CITRATE 20 MG: 10 TABLET ORAL at 08:31

## 2022-11-17 RX ADMIN — CITALOPRAM HYDROBROMIDE 30 MG: 20 TABLET ORAL at 18:18

## 2022-11-17 RX ADMIN — ACETAMINOPHEN 650 MG: 325 TABLET ORAL at 18:18

## 2022-11-17 RX ADMIN — ACETAMINOPHEN 650 MG: 325 TABLET ORAL at 11:51

## 2022-11-17 RX ADMIN — OXYCODONE 5 MG: 5 TABLET ORAL at 18:43

## 2022-11-17 RX ADMIN — OXYCODONE 10 MG: 5 TABLET ORAL at 08:31

## 2022-11-17 RX ADMIN — LEVOTHYROXINE SODIUM 75 MCG: 0.07 TABLET ORAL at 05:32

## 2022-11-17 RX ADMIN — SODIUM CHLORIDE: 9 INJECTION, SOLUTION INTRAVENOUS at 05:32

## 2022-11-17 RX ADMIN — CEFAZOLIN SODIUM 2000 MG: 100 INJECTION, POWDER, LYOPHILIZED, FOR SOLUTION INTRAVENOUS at 02:25

## 2022-11-17 RX ADMIN — SODIUM CHLORIDE, PRESERVATIVE FREE 10 ML: 5 INJECTION INTRAVENOUS at 08:34

## 2022-11-17 RX ADMIN — CYCLOBENZAPRINE 10 MG: 10 TABLET, FILM COATED ORAL at 15:52

## 2022-11-17 RX ADMIN — ACETAMINOPHEN 650 MG: 325 TABLET ORAL at 05:32

## 2022-11-17 ASSESSMENT — PAIN SCALES - GENERAL
PAINLEVEL_OUTOF10: 8
PAINLEVEL_OUTOF10: 6
PAINLEVEL_OUTOF10: 0
PAINLEVEL_OUTOF10: 0
PAINLEVEL_OUTOF10: 7

## 2022-11-17 ASSESSMENT — PAIN DESCRIPTION - LOCATION
LOCATION: BACK

## 2022-11-17 ASSESSMENT — PAIN DESCRIPTION - ORIENTATION
ORIENTATION: POSTERIOR
ORIENTATION: LOWER;POSTERIOR

## 2022-11-17 ASSESSMENT — PAIN DESCRIPTION - DESCRIPTORS
DESCRIPTORS: ACHING;THROBBING
DESCRIPTORS: THROBBING

## 2022-11-17 NOTE — PROGRESS NOTES
ACUTE OCCUPATIONAL THERAPY GOALS:   (Developed with and agreed upon by patient and/or caregiver.)  1. Patient will verbalize and demonstrate understanding of spinal precautions with 100% accuracy during ADLs. 2. Patient will complete lower body bathing and dressing with set up and adaptive equipment as needed. 3. Patient will complete functional transfers with modified independence and adaptive equipment as needed. 4. Patient will complete toileting and toilet transfer with modified independence. 5. Patient will complete functional mobility of household distances with modified independence and adaptive equipment as needed. 6. Patient will demonstrate ability to log roll in bed with modified independence and no verbal cues from therapist.     Timeframe: 7 visits      OCCUPATIONAL THERAPY Initial Assessment, Daily Note, and PM       OT Visit Days: 1  Acknowledge Orders  Time  OT Charge Capture  Rehab Caseload Tracker      Hector Márquez is a 81 y.o. female   PRIMARY DIAGNOSIS: Spondylolisthesis of lumbar region  Spondylolisthesis of lumbar region [M43.16]  Spinal stenosis of lumbar region with neurogenic claudication [M48.062]  S/P lumbar fusion [Z98.1]  Procedure(s) (LRB):  L1-L5 LAMINECTOMY and L2-L5 FUSION with allograft and instrumentation (N/A)  1 Day Post-Op  Reason for Referral: Generalized Muscle Weakness (M62.81)  Low Back Pain (M54.5)  Inpatient: Payor: Timothy Gilliland / Plan: Emil Auguste PPO / Product Type: Medicare /     ASSESSMENT:     REHAB RECOMMENDATIONS:   Recommendation to date pending progress:  Setting:  Home Health Therapy    Equipment:    To Be Determined     ASSESSMENT:  Ms. Anneliese Lucas presents with spondylolisthesis, spinal stenosis, and is 1 day post-op L1-L5 laminectomy and L2-L5 fusion. Pt has spinal precautions. Pt was supine upon arrival with visitors at bedside. Pt was educated on spinal precautions and how they impact ADL.  Pt was Min A for supine to sit with use of log roll. Pt BP sitting EOB was 116/47. Pt denies any dizziness or being light headed. Pt was CGA for sit to stand, grooming task standing at the sink, and functional mobility with RW. Pt returned to sitting EOB and BP was 116/40 with no complaints of dizziness. Pt left supine in bed with call bell, needs met, and visitors at bedside. Pt is limited by decreased strength, balance, and activity tolerance which impact ability to be safe and independent with ADL and functional mobility. OT is medically necessary and will address goals and plan of care.       [address] [facility] [place] [address] [address] AM-PAC 6 Clicks Daily Activity Inpatient Short Form:    AM-PAC Daily Activity Inpatient   How much help for putting on and taking off regular lower body clothing?: A Lot  How much help for Bathing?: A Lot  How much help for Toileting?: A Lot  How much help for putting on and taking off regular upper body clothing?: A Little  How much help for taking care of personal grooming?: A Little  How much help for eating meals?: A Little  AM-[facility] Inpatient Daily Activity Raw Score: 15  AM-PAC Inpatient ADL T-Scale Score : 34.69  ADL Inpatient CMS 0-100% Score: 56.46  ADL Inpatient CMS G-Code Modifier : CK           SUBJECTIVE:     Ms. 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[employer]"     Social/Functional Lives With: Spouse  Type of Home: House  Home Layout: One level  Home Access: Stairs to enter with rails  Entrance Stairs - Number of Steps: 2  Has the patient had two or more falls in the past year or any fall with injury in the past year?: No  ADL Assistance: Independent  Ambulation Assistance: Independent  Transfer Assistance: Independent  Active : Yes    OBJECTIVE:     Emory Flores / Amber Handy / Rocio Flowers: IV and LAURIE Drain    RESTRICTIONS/PRECAUTIONS:  Restrictions/Precautions: Fall Risk  Position Activity Restriction  Spinal Precautions: No Bending, No Twisting, No Lifting    PAIN: VITALS / O2:   Pre Treatment:   Pain Assessment:  (Reports pain in back but unable to rate)  Non-Pharmaceutical Pain Intervention(s): Repositioned      Post Treatment: Same        Vitals          Oxygen            GROSS EVALUATION: INTACT IMPAIRED   (See Comments)   UE AROM [] []Decreased LUE external rotation    UE PROM [] []   Strength []  Generalized weakness      Posture / Balance [] Posture: Fair  Sitting - Static: Good  Sitting - Dynamic: Fair, +  Standing - Static: Fair  Standing - Dynamic: Fair   Sensation []     Coordination []  Decreased B UE speed and accuracy      Tone []       Edema []    Activity Tolerance []  Limited by fatigue      Hand Dominance R [x] L []      COGNITION/  PERCEPTION: INTACT IMPAIRED   (See Comments)   Orientation [x]     Vision []     Hearing [x]     Cognition  []     Perception []       MOBILITY: I Mod I S SBA CGA Min Mod Max Total  NT x2 Comments:   Bed Mobility    Rolling [] [] [] [] [] [x] [] [] [] [] []    Supine to Sit [] [] [] [] [] [x] [] [] [] [] [] Log roll    Scooting [] [] [] [] [x] [] [] [] [] [] []    Sit to Supine [] [] [] [] [] [x] [] [] [] [] []    Transfers    Sit to Stand [] [] [] [] [x] [] [] [] [] [] []    Bed to Chair [] [] [] [] [] [] [] [] [] [x] []    Stand to Sit [] [] [] [] [x] [] [] [] [] [] []    Tub/Shower [] [] [] [] [] [] [] [] [] [x] []     Toilet [] [] [] [] [] [] [] [] [] [x] []      [] [] [] [] [] [] [] [] [] [] []    I=Independent, Mod I=Modified Independent, S=Supervision/Setup, SBA=Standby Assistance, CGA=Contact Guard Assistance, Min=Minimal Assistance, Mod=Moderate Assistance, Max=Maximal Assistance, Total=Total Assistance, NT=Not Tested    ACTIVITIES OF DAILY LIVING: I Mod I S SBA CGA Min Mod Max Total NT Comments   BASIC ADLs:              Upper Body Bathing  [] [] [] [] [] [] [] [] [] [x]    Lower Body Bathing [] [] [] [] [] [] [] [] [] [x]    Toileting [] [] [] [] [] [] [] [] [] [x]    Upper Body Dressing [] [] [] [] [] [] [] [] [] [x]    Lower Body Dressing [] [] [] [] [] [] [] [] [] [x]    Feeding [] [] [] [] [] [] [] [] [] [x]    Grooming [] [] [] [] [x] [] [] [] [] [] Brushing teeth standing at the sink    Personal Device Care [] [] [] [] [] [] [] [] [] [x]    Functional Mobility [] [] [] [] [x] [] [] [] [] [] With RW    I=Independent, Mod I=Modified Independent, S=Supervision/Setup, SBA=Standby Assistance, CGA=Contact Guard Assistance, Min=Minimal Assistance, Mod=Moderate Assistance, Max=Maximal Assistance, Total=Total Assistance, NT=Not Tested    PLAN:   FREQUENCY/DURATION   OT Plan of Care: 3 times/week for duration of hospital stay or until stated goals are met, whichever comes first.    PROBLEM LIST:   (Skilled intervention is medically necessary to address:)  Decreased ADL/Functional Activities  Decreased Activity Tolerance  Decreased AROM/PROM  Decreased Balance  Decreased Coordination  Decreased Gait Ability  Decreased Strength  Decreased Transfer Abilities  Increased Pain   INTERVENTIONS PLANNED:  (Benefits and precautions of occupational therapy have been discussed with the patient.)  Self Care Training  Therapeutic Activity  Therapeutic Exercise/HEP  Neuromuscular Re-education  Manual Therapy  Education         TREATMENT:     EVALUATION: LOW COMPLEXITY: (Untimed Charge)    TREATMENT:   Therapeutic Activity (15 Minutes): Patient participated in therapeutic activities including functional transfer training, functional mobility of household distances, sitting tolerance activity, and standing tolerance activity with minimal assistance, verbal cues, and education in order to increase independence, increase safety awareness, and decrease assistance required. Self Care (13 minutes): Patient participated in grooming ADLs in standing with minimal verbal and tactile cueing to increase independence, decrease assistance required, increase activity tolerance, and increase safety awareness.  Patient also participated in functional mobility and functional transfer training to increase independence, decrease assistance required, and increase activity tolerance. TREATMENT GRID:  N/A    AFTER TREATMENT PRECAUTIONS: Bed, Bed/Chair Locked, Call light within reach, Needs within reach, and Visitors at bedside    INTERDISCIPLINARY COLLABORATION:  RN/ PCT, OT/ PARISH, and OTS    EDUCATION:  Education Given To: Patient; Family  Education Provided: Role of Therapy;Plan of Care;Precautions  Education Method: Verbal  Education Outcome: Verbalized understanding    TOTAL TREATMENT DURATION AND TIME:  Time In: [facility]  Time Out: [address] [facility]  Minutes: [address] [address]

## 2022-11-17 NOTE — PROGRESS NOTES
ACUTE PHYSICAL THERAPY GOALS:   (Developed with and agreed upon by patient and/or caregiver.)    (1.) Oral Leonardo  will move from supine to sit and sit to supine  with INDEPENDENCE within 7 treatment day(s). (2.) Oral Leonardo will transfer from bed to chair and chair to bed with MODIFIED INDEPENDENCE using the least restrictive device within 7 treatment day(s). (3.) Oral Leonardo will ambulate with MODIFIED INDEPENDENCE for 250 feet with the least restrictive device within 7 treatment day(s). (4.) Oral Leonardo will perform standing static and dynamic balance activities x 15 minutes with SUPERVISION to improve safety within 7 treatment day(s). (5.) Oral Leonardo will ascend and descend 2 stairs using 1 hand rail(s) with SUPERVISION to improve functional mobility and safety within 7 treatment day(s). (6.) Oral Leonardo will perform bilateral lower extremity exercises x 20 min for HEP with INDEPENDENCE to improve strength, endurance, and functional mobility within 7 treatment day(s).        PHYSICAL THERAPY Initial Assessment, Daily Note, and AM  (Link to Caseload Tracking: PT Visit Days : 1  Acknowledge Orders  Time In/Out  PT Charge Capture  Rehab Caseload Tracker    Oral Leonardo is a 81 y.o. female   PRIMARY DIAGNOSIS: Spondylolisthesis of lumbar region  Spondylolisthesis of lumbar region [M43.16]  Spinal stenosis of lumbar region with neurogenic claudication [M48.062]  S/P lumbar fusion [Z98.1]  Procedure(s) (LRB):  L1-L5 LAMINECTOMY and L2-L5 FUSION with allograft and instrumentation (N/A)  1 Day Post-Op  Reason for Referral: Generalized Muscle Weakness (M62.81)  Difficulty in walking, Not elsewhere classified (R26.2)  Low Back Pain (M54.5)  Inpatient: Payor: Alexis Lux / Plan: Jacqueline Conklin PPO / Product Type: Medicare /     ASSESSMENT:     REHAB RECOMMENDATIONS:   Recommendation to date pending progress:  [address] [place] [address] Therapy    Equipment:    3 in 1 Bedside Commode  Pt has RW, cane, w/c     ASSESSMENT:  Ms. Steven Ramirez  is a 81year old F who presents s/p above procedure POD 1. At baseline, pt is independent. Does endorse history of vertigo and falls but denies any falls in the last 6 months. This date pt performs mobility including bed mobility with Nasir via log roll technique. She stood with Nasir, became lightheaded in standing. Pt returned to sitting, /52, symptoms resolved with rest. She was able to ambulate 15 ft and transfer to chair with RW and CGA. Pt with decreased speed and step length and is unsteady on feet likely due to feeling lightheaded. BP noted to drop to 106/41 post ambulation. Symptoms improved with rest and elevating legs in chair. RN notified of pt performance and vitals. Pt educated on spinal precautions, able to maintain them throughout mobility with minimal cueing. Anticipate pt will be able to discharge with [facility] PT with further acute therapy sessions and once BP is more controlled with mobility. Pt presents as functioning below her baseline, with deficits in mobility including transfers, gait, balance, and activity tolerance. Pt will benefit from skilled therapy services to address stated deficits to promote return to highest level of function, independence, and safety. Will continue to follow.      [address] [facility] [address] [address] AM-PAC 6 Clicks Basic Mobility Inpatient Short Form  AM-PAC Mobility Inpatient   How much difficulty turning over in bed?: A Little  How much difficulty sitting down on / standing up from a chair with arms?: A Little  How much difficulty moving from lying on back to sitting on side of bed?: A Little  How much help from another person moving to and from a bed to a chair?: A Little  How much help from another person needed to walk in hospital room?: A Little  How much help from another person for climbing 3-5 steps with a railing?: A Lot  AM-PAC Inpatient Mobility Raw Score : 17  AM-PAC Inpatient T-Scale Score : 42.13  Mobility Inpatient CMS 0-100% Score: 50.57  Mobility Inpatient CMS G-Code Modifier : CK    SUBJECTIVE:   Ms. 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 \"     Social/Functional Lives With: Spouse  Type of Home: House  Home Layout: One level  Home Access: Stairs to enter with rails  Entrance Stairs - Number of Steps: 2  Has the patient had two or more falls in the past year or any fall with injury in the past year?: No  ADL Assistance: Independent  Ambulation Assistance: Independent  Transfer Assistance: Independent  Active : Yes    OBJECTIVE:     PAIN: Karely Salen / O2: Francheska Mercado / Eunice Sales / Laurie Purple:   Pre Treatment:   Pain Assessment: 0-10  Pain Level: 7  Pain Location: Back  Non-Pharmaceutical Pain Intervention(s): Ambulation/Increased Activity;Nurse notified (comment)      Post Treatment: 7 Vitals   Vitals  BP: (!) 106/41 (post ambulation, RN notified)  MAP (Calculated): 63    Oxygen     >92% on RA throughout session IV and LAURIE Drain    RESTRICTIONS/PRECAUTIONS:  Restrictions/Precautions: Fall Risk        Spinal Precautions: No Bending, No Twisting, No Lifting        GROSS EVALUATION: B LE Intact Impaired (Comments):   AROM [x]     PROM [x]    Strength []  Generalized postop weakness   Balance [] Posture: Fair  Sitting - Static: Good  Sitting - Dynamic: Fair, +  Standing - Static: Fair  Standing - Dynamic: Fair   Posture [] Forward Head  Rounded Shoulders   Sensation [x]  Light touch   Coordination []      Tone []     Edema []    Activity Tolerance [] Patient limited by pain, Treatment limited secondary to medical complications (free text) (low BP)    []      COGNITION/  PERCEPTION: Intact Impaired (Comments):   Orientation [x]     Vision [x]     Hearing [x]     Cognition  [x]       MOBILITY: I Mod I S SBA CGA Min Mod Max Total  NT x2 Comments:   Bed Mobility    Rolling [] [] [] [] [] [x] [] [] [] [] []    Supine to Sit [] [] [] [] [] [x] [] [] [] [] []    Scooting [] [] [] [] [] [x] [] [] [] [] []    Sit to Supine [] [] [] [] [] [] [] [] [] [x] []    Transfers    Sit to Stand [] [] [] [] [] [x] [] [] [] [] []    Bed to Chair [] [] [] [] [x] [] [] [] [] [] []    Stand to Sit [] [] [] [] [] [x] [] [] [] [] []     [] [] [] [] [] [] [] [] [] [] []    I=Independent, Mod I=Modified Independent, S=Supervision, SBA=Standby Assistance, CGA=Contact Guard Assistance,   Min=Minimal Assistance, Mod=Moderate Assistance, Max=Maximal Assistance, Total=Total Assistance, NT=Not Tested    GAIT: I Mod I S SBA CGA Min Mod Max Total  NT x2 Comments:   Level of Assistance [] [] [] [] [x] [] [] [] [] [] []    Distance 15 feet    DME Gait Belt and Rolling Walker    Gait Quality Decreased gely , Decreased step length, and Trunk sway increased    Weightbearing Status Restrictions/Precautions  Restrictions/Precautions: Fall Risk    Stairs      I=Independent, Mod I=Modified Independent, S=Supervision, SBA=Standby Assistance, CGA=Contact Guard Assistance,   Min=Minimal Assistance, Mod=Moderate Assistance, Max=Maximal Assistance, Total=Total Assistance, NT=Not Tested    PLAN:   FREQUENCY AND DURATION: BID for duration of hospital stay or until stated goals are met, whichever comes first.    THERAPY PROGNOSIS: Good    PROBLEM LIST:   (Skilled intervention is medically necessary to address:)  Decreased ADL/Functional Activities  Decreased Activity Tolerance  Decreased Balance  Decreased Gait Ability  Decreased Strength  Decreased Transfer Abilities  Increased Pain INTERVENTIONS PLANNED:   (Benefits and precautions of physical therapy have been discussed with the patient.)  Self Care Training  Therapeutic Activity  Therapeutic Exercise/HEP  Neuromuscular Re-education  Gait Training  Education       TREATMENT:   EVALUATION: LOW COMPLEXITY: (Untimed Charge)    TREATMENT:   Therapeutic Activity (36 Minutes):  Therapeutic activity included Rolling, Supine to Sit, Scooting, Transfer Training, Ambulation on level ground, Sitting balance , and Standing balance to improve functional Activity tolerance, Balance, Mobility, and Strength. TREATMENT GRID:  N/A    AFTER TREATMENT PRECAUTIONS: Bed/Chair Locked, Call light within reach, Chair, Needs within reach, and RN notified    INTERDISCIPLINARY COLLABORATION:  RN/ PCT    EDUCATION: Education Given To: Patient  Education Provided: Role of Therapy;Plan of Care; Fall Prevention Strategies; Equipment;Transfer Training;Precautions  Education Method: Verbal  Barriers to Learning: None  Education Outcome: Verbalized understanding    TIME IN/OUT:  Time In: 0906  Time Out: 0950  Minutes: [facility] BRIAN PT

## 2022-11-17 NOTE — PROGRESS NOTES
ORTHOPAEDIC PROGRESS NOTE    2022  Admit Date: 2022  Admit Diagnosis: Spondylolisthesis of lumbar region [M43.16]  Spinal stenosis of lumbar region with neurogenic claudication [M48.062]  S/P lumbar fusion [Z98.1]  Procedure: Procedure(s):  L1-L5 LAMINECTOMY and L2-L5 FUSION with allograft and instrumentation  Post Op day: 1 Day Post-Op      Subjective:     Dion Camilo is a patient who has no complaints. Marin just removed. Has not voided yet. Objective:     Vital Signs:    Blood pressure (!) 120/52, pulse 65, temperature 97.8 °F (36.6 °C), temperature source Oral, resp. rate 18, height 5' 4\" (1.626 m), weight 180 lb (81.6 kg), SpO2 100 %. Temp (24hrs), Av.2 °F (36.8 °C), Min:97.8 °F (36.6 °C), Max:98.5 °F (36.9 °C)      No intake/output data recorded. 11/15 1901 -  0700  In: 1500 [I.V.:1500]  Out: 2102 [Urine:1440; Drains:190]    LAB:    No results for input(s): HGB, WBC, PLT in the last 72 hours. Physical Exam    General:   Alert and oriented. No acute distress  Lungs:  Respirations unlabored. Extremities: No evidence of cyanosis. Calves soft, nontender. Moves both upper and lower extremities.    Dressing:  clean, dry, and intact  Neuro:  no deficit      Assessment:      Patient Active Problem List   Diagnosis    Melanoma ([facility] [address]    Spondylolisthesis of lumbar region    Spinal stenosis of lumbar region with neurogenic claudication    S/P lumbar fusion       Plan:     Continue PT/OT  Discontinue: marin and drain when less than 40 or Friday  Consult: none    Anticipate Discharge To: HOME Friday       Signed By: Tom Siddiqui PA-C

## 2022-11-17 NOTE — PROGRESS NOTES
ACUTE PHYSICAL THERAPY GOALS:   (Developed with and agreed upon by patient and/or caregiver.)  (1.) Aleena Barros  will move from supine to sit and sit to supine  with INDEPENDENCE within 7 treatment day(s). (2.) Aleena Barros will transfer from bed to chair and chair to bed with MODIFIED INDEPENDENCE using the least restrictive device within 7 treatment day(s). (3.) Aleena Barros will ambulate with MODIFIED INDEPENDENCE for 250 feet with the least restrictive device within 7 treatment day(s). (4.) Aleena Barros will perform standing static and dynamic balance activities x 15 minutes with SUPERVISION to improve safety within 7 treatment day(s). (5.) Aleena Barros will ascend and descend 2 stairs using 1 hand rail(s) with SUPERVISION to improve functional mobility and safety within 7 treatment day(s). (6.) Aleena Barros will perform bilateral lower extremity exercises x 20 min for HEP with INDEPENDENCE to improve strength, endurance, and functional mobility within 7 treatment day(s). PHYSICAL THERAPY: Daily Note PM   (Link to Caseload Tracking: PT Visit Days : 1  Time In/Out PT Charge Capture  Rehab Caseload Tracker  Orders    Aleena Barros is a 81 y.o. female   PRIMARY DIAGNOSIS: Spondylolisthesis of lumbar region  Spondylolisthesis of lumbar region [M43.16]  Spinal stenosis of lumbar region with neurogenic claudication [M48.062]  S/P lumbar fusion [Z98.1]  Procedure(s) (LRB):  L1-L5 LAMINECTOMY and L2-L5 FUSION with allograft and instrumentation (N/A)  1 Day Post-Op  Inpatient: Payor: Nivia Rosales / Plan: Boyd Lucas PPO / Product Type: Medicare /     ASSESSMENT:     REHAB RECOMMENDATIONS:   Recommendation to date pending progress:  Setting:  Home Health Therapy    Equipment:    3 in 1 Bedside Commode  Has RW     ASSESSMENT:  Ms. Epi Andrea was received supine in bed, agreeable to therapy. She was able to sit at EOB with Nasir.  She was able to progress to ambulating 50 ft with RW and CGA. Pt denied feeling lightheaded throughout session. Good progress, will continue to follow.       SUBJECTIVE:   Ms. Kyle Whiting states, \"thank you\"     Social/Functional Lives With: Spouse  Type of Home: House  Home Layout: One level  Home Access: Stairs to enter with rails  Entrance Stairs - Number of Steps: 2  Has the patient had two or more falls in the past year or any fall with injury in the past year?: No  ADL Assistance: Independent  Ambulation Assistance: Independent  Transfer Assistance: Independent  Active : Yes  OBJECTIVE:     PAIN: Terrance Jey / O2: Vaughn Mayen / Cade Campoverde / Charmayne Big:   Pre Treatment:   Pain Assessment: None - Denies Pain      Post Treatment: 0 Vitals       Oxygen    LAURIE Drain    RESTRICTIONS/PRECAUTIONS:  Restrictions/Precautions  Restrictions/Precautions: Fall Risk  Position Activity Restriction  Spinal Precautions: No Bending, No Twisting, No Lifting  Restrictions/Precautions: Fall Risk     MOBILITY: I Mod I S SBA CGA Min Mod Max Total  NT x2 Comments:   Bed Mobility    Rolling [] [] [] [] [] [x] [] [] [] [] []    Supine to Sit [] [] [] [] [] [x] [] [] [] [] []    Scooting [] [] [] [] [] [x] [] [] [] [] []    Sit to Supine [] [] [] [] [] [] [] [] [] [x] []    Transfers    Sit to Stand [] [] [] [] [x] [] [] [] [] [] []    Bed to Chair [] [] [] [] [x] [] [] [] [] [] []    Stand to Sit [] [] [] [] [x] [] [] [] [] [] []     [] [] [] [] [] [] [] [] [] [] []    I=Independent, Mod I=Modified Independent, S=Supervision, SBA=Standby Assistance, CGA=Contact Guard Assistance,   Min=Minimal Assistance, Mod=Moderate Assistance, Max=Maximal Assistance, Total=Total Assistance, NT=Not Tested    BALANCE: Good Fair+ Fair Fair- Poor NT Comments   Sitting Static [x] [] [] [] [] []    Sitting Dynamic [] [x] [] [] [] []              Standing Static [] [x] [] [] [] []    Standing Dynamic [] [] [x] [] [] []      GAIT: I Mod I S SBA CGA Min Mod Max Total  NT x2 Comments: Level of Assistance [] [] [] [] [x] [] [] [] [] [] []    Distance 50 feet    DME Gait Belt and Rolling Walker    Gait Quality Decreased gely , Decreased step length, and Trunk sway increased    Weightbearing Status      Stairs      I=Independent, Mod I=Modified Independent, S=Supervision, SBA=Standby Assistance, CGA=Contact Guard Assistance,   Min=Minimal Assistance, Mod=Moderate Assistance, Max=Maximal Assistance, Total=Total Assistance, NT=Not Tested    PLAN:   FREQUENCY AND DURATION: BID for duration of hospital stay or until stated goals are met, whichever comes first.    TREATMENT:   TREATMENT:   Therapeutic Activity (23 Minutes): Therapeutic activity included Rolling, Supine to Sit, Scooting, Transfer Training, Ambulation on level ground, Sitting balance , and Standing balance to improve functional Activity tolerance, Balance, Mobility, and Strength. TREATMENT GRID:  N/A    AFTER TREATMENT PRECAUTIONS: Bed/Chair Locked, Call light within reach, Chair, Needs within reach, RN notified, and Visitors at bedside    INTERDISCIPLINARY COLLABORATION:  RN/ PCT    EDUCATION: Education Given To: Patient  Education Provided: Role of Therapy;Plan of Care; Fall Prevention Strategies; Equipment;Transfer Training;Precautions  Education Method: Verbal  Barriers to Learning: None  Education Outcome: Verbalized understanding    TIME IN/OUT:  Time In: 1526  Time Out: JUANCARLOS Vazquez 23  Minutes: Chavo Blackmon 42 BRIAN, PT

## 2022-11-17 NOTE — CARE COORDINATION
CM assessment completed; see below. Patient admitted for elective procedure with Dr. Norma Ruiz. HH was previously arranged through his office with [facility]. CM will continue to follow for final DME recommendations. No other CM needs noted at this time. 11/17/22 7199   Service Assessment   Patient Orientation Alert and Oriented   Cognition Alert   History Provided By Medical Record   Primary Caregiver Self   Accompanied By/Relationship Child   [address]   Patient's Healthcare Decision Maker is: Legal Next of Kin   PCP Verified by CM Yes   Prior Functional Level Independent in ADLs/IADLs   Current Functional Level Assistance with the following:;Bathing;Dressing; Toileting;Cooking;Housework; Shopping;Mobility   Can patient return to prior living arrangement Yes   Ability to make needs known: Good   Family able to assist with home care needs: Yes   Would you like for me to discuss the discharge plan with any other family members/significant others, and if so, who? No   [email or web address] Other (Comment)  (N/A)   Discharge Planning   Type of Residence House   Living Arrangements Spouse/Significant Other   Current Services Prior To Admission None   Potential Assistance Needed Durable Medical Equipment;Home Care   Potential DME Needed Bedside Commode   Type of Home Care Services OT;PT   Patient expects to be discharged to: Tadeo Morales [facility] Discharge   Transition of Care Consult (CM Consult) [facility] No   Reason Outside [address] Physician referred to specific agency   Condition of Participation: Discharge Planning   The Plan for Transition of Care is related to the following treatment goals: Patient will participate in [place] therapies in order to return to safe baseline independence in ADLs.

## 2022-11-17 NOTE — PLAN OF CARE
Problem: Pain  Goal: Verbalizes/displays adequate comfort level or baseline comfort level  Outcome: Progressing     Problem: Discharge Planning  Goal: Discharge to home or other facility with appropriate resources  Outcome: Progressing  Flowsheets (Taken 11/16/2022 1940)  Discharge to home or other facility with appropriate resources: Identify barriers to discharge with patient and caregiver

## 2022-11-18 PROCEDURE — 97116 GAIT TRAINING THERAPY: CPT

## 2022-11-18 PROCEDURE — 6370000000 HC RX 637 (ALT 250 FOR IP): Performed by: ORTHOPAEDIC SURGERY

## 2022-11-18 PROCEDURE — 97530 THERAPEUTIC ACTIVITIES: CPT

## 2022-11-18 PROCEDURE — 2580000003 HC RX 258: Performed by: ORTHOPAEDIC SURGERY

## 2022-11-18 PROCEDURE — 1100000000 HC RM PRIVATE

## 2022-11-18 PROCEDURE — 97535 SELF CARE MNGMENT TRAINING: CPT

## 2022-11-18 RX ADMIN — ATORVASTATIN CALCIUM 10 MG: 10 TABLET, FILM COATED ORAL at 00:02

## 2022-11-18 RX ADMIN — SODIUM CHLORIDE, PRESERVATIVE FREE 10 ML: 5 INJECTION INTRAVENOUS at 22:11

## 2022-11-18 RX ADMIN — ACETAMINOPHEN 650 MG: 325 TABLET ORAL at 13:38

## 2022-11-18 RX ADMIN — SODIUM CHLORIDE, PRESERVATIVE FREE 10 ML: 5 INJECTION INTRAVENOUS at 00:02

## 2022-11-18 RX ADMIN — CYCLOBENZAPRINE 10 MG: 10 TABLET, FILM COATED ORAL at 00:02

## 2022-11-18 RX ADMIN — ACETAMINOPHEN 650 MG: 325 TABLET ORAL at 18:49

## 2022-11-18 RX ADMIN — ACETAMINOPHEN 650 MG: 325 TABLET ORAL at 22:11

## 2022-11-18 RX ADMIN — ACETAMINOPHEN 650 MG: 325 TABLET ORAL at 00:02

## 2022-11-18 RX ADMIN — ATORVASTATIN CALCIUM 10 MG: 10 TABLET, FILM COATED ORAL at 22:11

## 2022-11-18 RX ADMIN — LEVOTHYROXINE SODIUM 75 MCG: 0.07 TABLET ORAL at 05:53

## 2022-11-18 RX ADMIN — OXYCODONE 10 MG: 5 TABLET ORAL at 05:57

## 2022-11-18 RX ADMIN — TAMOXIFEN CITRATE 20 MG: 10 TABLET ORAL at 10:16

## 2022-11-18 RX ADMIN — CITALOPRAM HYDROBROMIDE 30 MG: 20 TABLET ORAL at 18:49

## 2022-11-18 RX ADMIN — SODIUM CHLORIDE, PRESERVATIVE FREE 10 ML: 5 INJECTION INTRAVENOUS at 10:16

## 2022-11-18 RX ADMIN — CYCLOBENZAPRINE 10 MG: 10 TABLET, FILM COATED ORAL at 22:11

## 2022-11-18 RX ADMIN — ACETAMINOPHEN 650 MG: 325 TABLET ORAL at 05:53

## 2022-11-18 ASSESSMENT — PAIN DESCRIPTION - LOCATION
LOCATION: BACK

## 2022-11-18 ASSESSMENT — PAIN DESCRIPTION - DESCRIPTORS
DESCRIPTORS: ACHING
DESCRIPTORS: ACHING

## 2022-11-18 ASSESSMENT — PAIN SCALES - GENERAL
PAINLEVEL_OUTOF10: 7
PAINLEVEL_OUTOF10: 0
PAINLEVEL_OUTOF10: 3
PAINLEVEL_OUTOF10: 0

## 2022-11-18 ASSESSMENT — PAIN - FUNCTIONAL ASSESSMENT
PAIN_FUNCTIONAL_ASSESSMENT: PREVENTS OR INTERFERES SOME ACTIVE ACTIVITIES AND ADLS
PAIN_FUNCTIONAL_ASSESSMENT: PREVENTS OR INTERFERES SOME ACTIVE ACTIVITIES AND ADLS

## 2022-11-18 ASSESSMENT — PAIN DESCRIPTION - ONSET
ONSET: PROGRESSIVE
ONSET: ON-GOING

## 2022-11-18 ASSESSMENT — PAIN DESCRIPTION - PAIN TYPE
TYPE: SURGICAL PAIN
TYPE: SURGICAL PAIN

## 2022-11-18 ASSESSMENT — PAIN DESCRIPTION - FREQUENCY
FREQUENCY: INTERMITTENT
FREQUENCY: INTERMITTENT

## 2022-11-18 ASSESSMENT — PAIN DESCRIPTION - ORIENTATION
ORIENTATION: LOWER
ORIENTATION: LOWER

## 2022-11-18 NOTE — PROGRESS NOTES
ACUTE PHYSICAL THERAPY GOALS:   (Developed with and agreed upon by patient and/or caregiver.)  (1.) Letitia Kenney  will move from supine to sit and sit to supine  with INDEPENDENCE within 7 treatment day(s). (2.) Letitia Kenney will transfer from bed to chair and chair to bed with MODIFIED INDEPENDENCE using the least restrictive device within 7 treatment day(s). (3.) Letitia Kenney will ambulate with MODIFIED INDEPENDENCE for 250 feet with the least restrictive device within 7 treatment day(s). (4.) Letitia Kenney will perform standing static and dynamic balance activities x 15 minutes with SUPERVISION to improve safety within 7 treatment day(s). (5.) Letitia Kenney will ascend and descend 2 stairs using 1 hand rail(s) with SUPERVISION to improve functional mobility and safety within 7 treatment day(s). (6.) Letitia Kenney will perform bilateral lower extremity exercises x 20 min for HEP with INDEPENDENCE to improve strength, endurance, and functional mobility within 7 treatment day(s). PHYSICAL THERAPY: Daily Note PM   (Link to Caseload Tracking: PT Visit Days : 2  Time In/Out PT Charge Capture  Rehab Caseload Tracker  Orders    Letitia Kenney is a 81 y.o. female   PRIMARY DIAGNOSIS: Spondylolisthesis of lumbar region  Spondylolisthesis of lumbar region [M43.16]  Spinal stenosis of lumbar region with neurogenic claudication [M48.062]  S/P lumbar fusion [Z98.1]  Procedure(s) (LRB):  L1-L5 LAMINECTOMY and L2-L5 FUSION with allograft and instrumentation (N/A)  2 Days Post-Op  Inpatient: Payor: Lena De La Cruz / Plan: [facility] PPO / Product Type: Medicare /     ASSESSMENT:     REHAB RECOMMENDATIONS:   Recommendation to date pending progress:  Setting:  [address]:    3 in 1 Bedside Commode  Has RW     ASSESSMENT:  Ms. Jennifer Damon demonstrated good progress toward goals with increased mobility and activity tolerance.   She ambulated with SBA and minimal cueing for posture. Gait is slow, but steady throughout. She performed multiple STS transfers during ADL activities and demonstrated good static standing balance.        SUBJECTIVE:   Ms. Muriel Tyson states, \"I'm motivated\"     Social/Functional Lives With: Spouse  Type of Home: House  Home Layout: One level  Home Access: Stairs to enter with rails  Entrance Stairs - Number of Steps: 2  Has the patient had two or more falls in the past year or any fall with injury in the past year?: No  ADL Assistance: Independent  Ambulation Assistance: Independent  Transfer Assistance: Independent  Active : Yes  OBJECTIVE:     PAIN: [place] Eric / O2: Rosa Culp / Janet Ward / Shad Telloemaker:   Pre Treatment: 0         Post Treatment: 0 Vitals        Oxygen    LAURIE Drain    RESTRICTIONS/PRECAUTIONS:  Restrictions/Precautions  Restrictions/Precautions: Fall Risk  Position Activity Restriction  Spinal Precautions: No Bending, No Twisting, No Lifting  Restrictions/Precautions: Fall Risk     MOBILITY: I Mod I S SBA CGA Min Mod Max Total  NT x2 Comments:   Bed Mobility    Rolling [] [] [] [] [] [] [] [] [] [x] []    Supine to Sit [] [] [] [] [] [] [] [] [] [x] []    Scooting [] [] [] [] [] [] [] [] [] [x] []    Sit to Supine [] [] [] [] [] [] [] [] [] [x] []    Transfers    Sit to Stand [] [] [] [x] [] [] [] [] [] [] []    Bed to Chair [] [] [] [x] [] [] [] [] [] [] []    Stand to Sit [] [] [] [x] [] [] [] [] [] [] []     [] [] [] [] [] [] [] [] [] [] []    I=Independent, Mod I=Modified Independent, S=Supervision, SBA=Standby Assistance, CGA=Contact Guard Assistance,   Min=Minimal Assistance, Mod=Moderate Assistance, Max=Maximal Assistance, Total=Total Assistance, NT=Not Tested    BALANCE: Good Fair+ Fair Fair- Poor NT Comments   Sitting Static [x] [] [] [] [] []    Sitting Dynamic [] [x] [] [] [] []              Standing Static [] [x] [] [] [] []    Standing Dynamic [] [x] [] [] [] []      GAIT: I Mod I S SBA CGA Min Mod Max Total  NT x2 Comments:   Level of Assistance [] [] [] [x] [] [] [] [] [] [] []    Distance 250  feet    DME Gait Belt and Rolling Walker    Gait Quality Decreased gely , Decreased step length, and Trunk sway increased    Weightbearing Status      Stairs      I=Independent, Mod I=Modified Independent, S=Supervision, SBA=Standby Assistance, CGA=Contact Guard Assistance,   Min=Minimal Assistance, Mod=Moderate Assistance, Max=Maximal Assistance, Total=Total Assistance, NT=Not Tested    PLAN:   FREQUENCY AND DURATION: BID for duration of hospital stay or until stated goals are met, whichever comes first.    TREATMENT:   TREATMENT:   Co-Treatment PT/OT necessary due to patient's decreased overall endurance/tolerance levels, as well as need for high level skilled assistance to complete functional transfers/mobility and functional tasks  Gait Training (23 Minutes): Gait training for 250 feet utilizing [facility]. Patient required Verbal cueing to improve Activity Pacing, Dynamic Standing Balance, and Gait Mechanics.      TREATMENT GRID:  N/A    AFTER TREATMENT PRECAUTIONS: Bed/Chair Locked, Call light within reach, Chair, Needs within reach, and RN notified    INTERDISCIPLINARY COLLABORATION:  RN/ PCT, PT/ PTA, and OT/ PARISH    EDUCATION:      TIME IN/OUT:  Time In: 1400  Time Out: [address]   [facility]: [facility]

## 2022-11-18 NOTE — PROGRESS NOTES
ORTHOPAEDIC PROGRESS NOTE    2022  Admit Date: 2022  Admit Diagnosis: Spondylolisthesis of lumbar region [M43.16]  Spinal stenosis of lumbar region with neurogenic claudication [M48.062]  S/P lumbar fusion [Z98.1]  Procedure: Procedure(s):  L1-L5 LAMINECTOMY and L2-L5 FUSION with allograft and instrumentation  Post Op day: 2 Days Post-Op      Subjective:     Claudia Grande is a patient who has complaints of limited mobility . Objective:     Vital Signs:    Blood pressure (!) 120/51, pulse 63, temperature 98.1 °F (36.7 °C), temperature source Oral, resp. rate 20, height 5' 4\" (1.626 m), weight 180 lb (81.6 kg), SpO2 91 %. Temp (24hrs), Av.5 °F (36.9 °C), Min:97.9 °F (36.6 °C), Max:99.5 °F (37.5 °C)      No intake/output data recorded.  1901 -  0700  In: -   Out:  [Urine:1550; Drains:445]    LAB:    No results for input(s): HGB, WBC, PLT in the last 72 hours. Physical Exam    General:   Alert and oriented. No acute distress  Lungs:  Respirations unlabored. Extremities: No evidence of cyanosis. Calves soft, nontender. Moves both upper and lower extremities.    Dressing:  clean, dry, and intact  Neuro:  no deficit      Assessment:      Patient Active Problem List   Diagnosis    Melanoma ([address]    Spondylolisthesis of lumbar region    Spinal stenosis of lumbar region with neurogenic claudication    S/P lumbar fusion       Plan:     Continue PT/OT  Discontinue:     Consult: none    Anticipate Discharge To: HOME with [facility] when cleared with PT        Signed By: SAMUEL Szymanski - KERRIE

## 2022-11-18 NOTE — PROGRESS NOTES
Received orders to remove LAURIE drain this AM, drained 60ml at 10:30 AM and then 85mls at 1330 PM. Spoke with Gabriel Serrano NP who verbally instructed me to leave the drain in until tomorrow morning. Back dressing also changed; incision looks clean, dry and intact. Will continue to monitor.

## 2022-11-18 NOTE — PROGRESS NOTES
ACUTE PHYSICAL THERAPY GOALS:   (Developed with and agreed upon by patient and/or caregiver.)  (1.) Jose Alfredo Foot  will move from supine to sit and sit to supine  with INDEPENDENCE within 7 treatment day(s). (2.) Jose Alfredo Foot will transfer from bed to chair and chair to bed with MODIFIED INDEPENDENCE using the least restrictive device within 7 treatment day(s). (3.) Jose Alfredo Foot will ambulate with MODIFIED INDEPENDENCE for 250 feet with the least restrictive device within 7 treatment day(s). (4.) Jose Alfredo Foot will perform standing static and dynamic balance activities x 15 minutes with SUPERVISION to improve safety within 7 treatment day(s). (5.) Jose Alfredo Foot will ascend and descend 2 stairs using 1 hand rail(s) with SUPERVISION to improve functional mobility and safety within 7 treatment day(s). (6.) Jose Alfredo Foot will perform bilateral lower extremity exercises x 20 min for HEP with INDEPENDENCE to improve strength, endurance, and functional mobility within 7 treatment day(s). PHYSICAL THERAPY: Daily Note AM   (Link to Caseload Tracking: PT Visit Days : 2  Time In/Out PT Charge Capture  Rehab Caseload Tracker  Orders    Jose Alfredo Merida is a 81 y.o. female   PRIMARY DIAGNOSIS: Spondylolisthesis of lumbar region  Spondylolisthesis of lumbar region [M43.16]  Spinal stenosis of lumbar region with neurogenic claudication [M48.062]  S/P lumbar fusion [Z98.1]  Procedure(s) (LRB):  L1-L5 LAMINECTOMY and L2-L5 FUSION with allograft and instrumentation (N/A)  2 Days Post-Op  Inpatient: Payor: Rosalina Sanchez / Plan: Javed LEDEZMA / Product Type: Medicare /     ASSESSMENT:     REHAB RECOMMENDATIONS:   Recommendation to date pending progress:  Setting:  [facility]:    3 in 1 Bedside Commode  Has RW     ASSESSMENT:  Ms. Muriel Tyson continues to make good progress toward goals with increased gait distances and activity tolerance. She required CGA/SBA with transfers and bed mobility. She demonstrated good static standing balance and dynamic sitting balance while maintaining precautions. Good participation throughout.       SUBJECTIVE:   Ms. Epi Andrea states, \"I want to go home\"     Social/Functional Lives With: Spouse  Type of Home: House  Home Layout: One level  Home Access: Stairs to enter with rails  Entrance Stairs - Number of Steps: 2  Has the patient had two or more falls in the past year or any fall with injury in the past year?: No  ADL Assistance: Independent  Ambulation Assistance: Independent  Transfer Assistance: Independent  Active : Yes  OBJECTIVE:     PAIN: E[place] / O2: Stuart Doylele / Bárbara Reasons / Selma Lauth:   Pre Treatment: 0         Post Treatment: 0 Vitals        Oxygen    LAURIE Drain    RESTRICTIONS/PRECAUTIONS:  Restrictions/Precautions  Restrictions/Precautions: Fall Risk  Position Activity Restriction  Spinal Precautions: No Bending, No Twisting, No Lifting  Restrictions/Precautions: Fall Risk     MOBILITY: I Mod I S SBA CGA Min Mod Max Total  NT x2 Comments:   Bed Mobility    Rolling [] [] [] [] [x] [] [] [] [] [] []    Supine to Sit [] [] [] [] [x] [x] [] [] [] [] []    Scooting [] [] [] [] [x] [] [] [] [] [] []    Sit to Supine [] [] [] [] [] [] [] [] [] [x] []    Transfers    Sit to Stand [] [] [] [] [x] [] [] [] [] [] []    Bed to Chair [] [] [] [] [x] [] [] [] [] [] []    Stand to Sit [] [] [] [] [x] [] [] [] [] [] []     [] [] [] [] [] [] [] [] [] [] []    I=Independent, Mod I=Modified Independent, S=Supervision, SBA=Standby Assistance, CGA=Contact Guard Assistance,   Min=Minimal Assistance, Mod=Moderate Assistance, Max=Maximal Assistance, Total=Total Assistance, NT=Not Tested    BALANCE: Good Fair+ Fair Fair- Poor NT Comments   Sitting Static [x] [] [] [] [] []    Sitting Dynamic [] [x] [] [] [] []              Standing Static [] [x] [] [] [] []    Standing Dynamic [] [x] [] [] [] []      GAIT: I Mod I S SBA CGA Min Mod Max Total  NT x2 Comments:   Level of Assistance [] [] [] [] [x] [] [] [] [] [] []    Distance 250  feet    DME Gait Belt and Rolling Walker    Gait Quality Decreased gely , Decreased step length, and Trunk sway increased    Weightbearing Status      Stairs      I=Independent, Mod I=Modified Independent, S=Supervision, SBA=Standby Assistance, CGA=Contact Guard Assistance,   Min=Minimal Assistance, Mod=Moderate Assistance, Max=Maximal Assistance, Total=Total Assistance, NT=Not Tested    PLAN:   FREQUENCY AND DURATION: BID for duration of hospital stay or until stated goals are met, whichever comes first.    TREATMENT:   TREATMENT:   Therapeutic Activity (38 Minutes): Therapeutic activity included Rolling, Supine to Sit, Scooting, Transfer Training, Ambulation on level ground, Sitting balance , and Standing balance to improve functional Activity tolerance, Balance, Mobility, and Strength.     TREATMENT GRID:  N/A    AFTER TREATMENT PRECAUTIONS: Bed/Chair Locked, Call light within reach, Chair, Needs within reach, and RN notified    INTERDISCIPLINARY COLLABORATION:  RN/ PCT    EDUCATION:      TIME IN/OUT:  Time In: 0920  Time Out: [id number] [facility]  Minutes: [address], PTA

## 2022-11-18 NOTE — PROGRESS NOTES
ACUTE OCCUPATIONAL THERAPY GOALS:   (Developed with and agreed upon by patient and/or caregiver.)  1. Patient will verbalize and demonstrate understanding of spinal precautions with 100% accuracy during ADLs. 2. Patient will complete lower body bathing and dressing with set up and adaptive equipment as needed. 3. Patient will complete functional transfers with modified independence and adaptive equipment as needed. 4. Patient will complete toileting and toilet transfer with modified independence. 5. Patient will complete functional mobility of household distances with modified independence and adaptive equipment as needed. 6. Patient will demonstrate ability to log roll in bed with modified independence and no verbal cues from therapist.     Timeframe: 7 visits      OCCUPATIONAL THERAPY: Daily Note PM   OT Visit Days: 2   Time In/Out  OT Charge Capture  Rehab Caseload Tracker  OT Orders    Reed Boas is a 81 y.o. female   PRIMARY DIAGNOSIS: Spondylolisthesis of lumbar region  Spondylolisthesis of lumbar region [M43.16]  Spinal stenosis of lumbar region with neurogenic claudication [M48.062]  S/P lumbar fusion [Z98.1]  Procedure(s) (LRB):  L1-L5 LAMINECTOMY and L2-L5 FUSION with allograft and instrumentation (N/A)  2 Days Post-Op  Inpatient: Payor: Mei Frausto / Plan: [employer]ergising PPO / Product Type: Medicare /     ASSESSMENT:     REHAB RECOMMENDATIONS: CURRENT LEVEL OF FUNCTION:  (Most Recently Demonstrated)   Recommendation to date pending progress:  Setting:  Home Health Therapy    Equipment:    To Be Determined Bathing:  Not Tested  Dressing:  Contact Guard Assist LB; set up UB   Feeding/Grooming:  Contact Guard Assist-SBA brushing teeth at sink   Toileting:  Not Tested  Functional Mobility:  Contact Guard Assist with RW      ASSESSMENT:  Ms. Lanette Goltz was sitting in the chair with daughter and nurse at bedside. Pt reported stiffness in back.  Pt was SBA-CGA for sit to stand and grooming task standing at the sink. Minimal verbal cues were given to follow spinal precautions. Pt was CGA for functional mobility with RW and LB dressing in sitting and standing. Pt was set up for UB dressing. Pt left sitting in the chair with call bell, needs met, and daughter at bedside. Pt made good progress towards dressing, functional transfers, and functional mobility goals. Continue OT plan of care.         SUBJECTIVE:     Ms. Kyle Pandya states, \"I'm feeling better today\"     Social/Functional Lives With: Spouse  Type of Home: House  Home Layout: One level  Home Access: Stairs to enter with rails  Entrance Stairs - Number of Steps: 2  Has the patient had two or more falls in the past year or any fall with injury in the past year?: No  ADL Assistance: Independent  Ambulation Assistance: Independent  Transfer Assistance: Independent  Active : Yes    OBJECTIVE:     Tellis Merlin / Clara Carey / Citlali Law: Ericka Fam Drain    RESTRICTIONS/PRECAUTIONS:  Restrictions/Precautions  Restrictions/Precautions: Fall Risk  Position Activity Restriction  Spinal Precautions: No Bending, No Twisting, No Lifting        PAIN: VITALS / O2:   Pre Treatment:   Pain Assessment:  (Stiffness in back but unable to rate)  Pain Location: Back  Non-Pharmaceutical Pain Intervention(s): Repositioned        Post Treatment: Same  Vitals          Oxygen        MOBILITY: I Mod I S SBA CGA Min Mod Max Total  NT x2 Comments:   Bed Mobility    Rolling [] [] [] [] [] [] [] [] [] [x] []    Supine to Sit [] [] [] [] [] [] [] [] [] [x] []    Scooting [] [] [] [x] [] [] [] [] [] [] []    Sit to Supine [] [] [] [] [] [] [] [] [] [x] []    Transfers    Sit to Stand [] [] [] [x] [x] [] [] [] [] [] []    Bed to Chair [] [] [] [] [] [] [] [] [] [x] []    Stand to Sit [] [] [] [x] [x] [] [] [] [] [] []    Tub/Shower [] [] [] [] [] [] [] [] [] [x] []     Toilet [] [] [] [] [] [] [] [] [] [x] []      [] [] [] [] [] [] [] [] [] [] []    I=Independent, Mod I=Modified Independent, S=Supervision/Setup, SBA=Standby Assistance, CGA=Contact Guard Assistance, Min=Minimal Assistance, Mod=Moderate Assistance, Max=Maximal Assistance, Total=Total Assistance, NT=Not Tested    ACTIVITIES OF DAILY LIVING: I Mod I S SBA CGA Min Mod Max Total NT Comments   BASIC ADLs:              Upper Body   Bathing [] [] [] [] [] [] [] [] [] [x]    Lower Body Bathing [] [] [] [] [] [] [] [] [] [x]    Toileting [] [] [] [] [] [] [] [] [] [x]    Upper Body Dressing [] [] [x] [] [] [] [] [] [] [] Donning shirt in sitting    Lower Body Dressing [] [] [] [] [x] [] [] [] [] [] Donning in pants in sitting and standing    Feeding [] [] [] [] [] [] [] [] [] [x]    Grooming [] [] [] [x] [x] [] [] [] [] [] Brushing teeth standing at the sink    Personal Device Care [] [] [] [] [] [] [] [] [] [x]    Functional Mobility [] [] [] [] [x] [] [] [] [] [] With RW    I=Independent, Mod I=Modified Independent, S=Supervision/Setup, SBA=Standby Assistance, CGA=Contact Guard Assistance, Min=Minimal Assistance, Mod=Moderate Assistance, Max=Maximal Assistance, Total=Total Assistance, NT=Not Tested    BALANCE: Good Fair+ Fair Fair- Poor NT Comments   Sitting Static [x] [] [] [] [] []    Sitting Dynamic [x] [] [] [] [] []              Standing Static [] [x] [] [] [] []    Standing Dynamic [] [] [x] [] [] []        PLAN:     FREQUENCY/DURATION   OT Plan of Care: 3 times/week for duration of hospital stay or until stated goals are met, whichever comes first.    TREATMENT:     TREATMENT:   Co-Treatment PT/OT necessary due to patient's decreased overall endurance/tolerance levels, as well as need for high level skilled assistance to complete functional transfers/mobility and functional tasks  Self Care (28 minutes): Patient participated in upper body dressing, lower body dressing, and grooming ADLs in supported sitting, unsupported sitting, and standing with minimal verbal cueing to increase independence, decrease assistance required, and increase activity tolerance. Patient also participated in functional mobility and functional transfer training to increase independence, decrease assistance required, and increase activity tolerance.      TREATMENT GRID:  N/A    AFTER TREATMENT PRECAUTIONS: Bed/Chair Locked, Call light within reach, Chair, Needs within reach, and Visitors at bedside    INTERDISCIPLINARY COLLABORATION:  RN/ PCT, PT/ PTA, OT/ PARISH, and OTS    EDUCATION:       TOTAL TREATMENT DURATION AND TIME:  Time In: [phone]  Time Out: [facility]  Minutes: [address], OTS

## 2022-11-19 VITALS
WEIGHT: 180 LBS | DIASTOLIC BLOOD PRESSURE: 46 MMHG | HEART RATE: 57 BPM | HEIGHT: 64 IN | RESPIRATION RATE: 18 BRPM | OXYGEN SATURATION: 98 % | BODY MASS INDEX: 30.73 KG/M2 | TEMPERATURE: 97.7 F | SYSTOLIC BLOOD PRESSURE: 127 MMHG

## 2022-11-19 PROCEDURE — 2580000003 HC RX 258: Performed by: ORTHOPAEDIC SURGERY

## 2022-11-19 PROCEDURE — 6370000000 HC RX 637 (ALT 250 FOR IP): Performed by: ORTHOPAEDIC SURGERY

## 2022-11-19 PROCEDURE — 97530 THERAPEUTIC ACTIVITIES: CPT

## 2022-11-19 RX ADMIN — ACETAMINOPHEN 650 MG: 325 TABLET ORAL at 06:25

## 2022-11-19 RX ADMIN — TAMOXIFEN CITRATE 20 MG: 10 TABLET ORAL at 09:08

## 2022-11-19 RX ADMIN — OXYCODONE 10 MG: 5 TABLET ORAL at 06:25

## 2022-11-19 RX ADMIN — DIPHENHYDRAMINE HYDROCHLORIDE 25 MG: 25 CAPSULE ORAL at 12:58

## 2022-11-19 RX ADMIN — SODIUM CHLORIDE, PRESERVATIVE FREE 10 ML: 5 INJECTION INTRAVENOUS at 09:08

## 2022-11-19 RX ADMIN — ACETAMINOPHEN 650 MG: 325 TABLET ORAL at 12:08

## 2022-11-19 RX ADMIN — LEVOTHYROXINE SODIUM 75 MCG: 0.07 TABLET ORAL at 06:25

## 2022-11-19 ASSESSMENT — PAIN - FUNCTIONAL ASSESSMENT: PAIN_FUNCTIONAL_ASSESSMENT: PREVENTS OR INTERFERES SOME ACTIVE ACTIVITIES AND ADLS

## 2022-11-19 ASSESSMENT — PAIN DESCRIPTION - LOCATION: LOCATION: BACK

## 2022-11-19 ASSESSMENT — PAIN DESCRIPTION - PAIN TYPE: TYPE: SURGICAL PAIN

## 2022-11-19 ASSESSMENT — PAIN DESCRIPTION - DESCRIPTORS: DESCRIPTORS: ACHING

## 2022-11-19 ASSESSMENT — PAIN SCALES - GENERAL
PAINLEVEL_OUTOF10: 8
PAINLEVEL_OUTOF10: 0
PAINLEVEL_OUTOF10: 0

## 2022-11-19 ASSESSMENT — PAIN DESCRIPTION - ORIENTATION: ORIENTATION: LOWER

## 2022-11-19 ASSESSMENT — PAIN DESCRIPTION - ONSET: ONSET: PROGRESSIVE

## 2022-11-19 ASSESSMENT — PAIN DESCRIPTION - FREQUENCY: FREQUENCY: INTERMITTENT

## 2022-11-19 NOTE — PROGRESS NOTES
ACUTE PHYSICAL THERAPY GOALS:   (Developed with and agreed upon by patient and/or caregiver.)  (1.) Marichuy Ann  will move from supine to sit and sit to supine  with INDEPENDENCE within 7 treatment day(s). (2.) Marichuy Ann will transfer from bed to chair and chair to bed with MODIFIED INDEPENDENCE using the least restrictive device within 7 treatment day(s). (3.) Marichuy Ann will ambulate with MODIFIED INDEPENDENCE for 250 feet with the least restrictive device within 7 treatment day(s). (4.) Marichuy Ann will perform standing static and dynamic balance activities x 15 minutes with SUPERVISION to improve safety within 7 treatment day(s). (5.) Marichuy Ann will ascend and descend 2 stairs using 1 hand rail(s) with SUPERVISION to improve functional mobility and safety within 7 treatment day(s). (6.) Marichuy Ann will perform bilateral lower extremity exercises x 20 min for HEP with INDEPENDENCE to improve strength, endurance, and functional mobility within 7 treatment day(s). PHYSICAL THERAPY: Daily Note AM   (Link to Caseload Tracking: PT Visit Days : 3  Time In/Out PT Charge Capture  Rehab Caseload Tracker  Orders    Marichuy Ann is a 81 y.o. female   PRIMARY DIAGNOSIS: Spondylolisthesis of lumbar region  Spondylolisthesis of lumbar region [M43.16]  Spinal stenosis of lumbar region with neurogenic claudication [M48.062]  S/P lumbar fusion [Z98.1]  Procedure(s) (LRB):  L1-L5 LAMINECTOMY and L2-L5 FUSION with allograft and instrumentation (N/A)  3 Days Post-Op  Inpatient: Payor: [address] [place] [address] / Plan: [address] [place] [address] PPO / Product Type: Medicare /     ASSESSMENT:     REHAB RECOMMENDATIONS:   Recommendation to date pending progress:  Setting:  [facility] 13:    3 in 1 Bedside Commode  Has RW     ASSESSMENT:  Ms. Dayday Sanchez demonstrated good progress toward goals with increased mobility and activity tolerance.   She was coming out of the bathroom on contact, RN present. She was able to ambulate using rolling walker with SBA and minimal cueing for posture. Gait is slow, but steady throughout with a few short standing rest breaks. Reviewed spinal precautions with pt. She was left up in chair with needs in reach. Will continue with POC.         SUBJECTIVE:   Ms. Kalani Diallo states, \"I'm ok\"     Social/Functional Lives With: Spouse  Type of Home: House  Home Layout: One level  Home Access: Stairs to enter with rails  Entrance Stairs - Number of Steps: 2  Has the patient had two or more falls in the past year or any fall with injury in the past year?: No  ADL Assistance: Independent  Ambulation Assistance: Independent  Transfer Assistance: Independent  Active : Yes  OBJECTIVE:     PAIN: Nicole Blight / O2: Mirela Tran / Claudio Taylor / Mendy Lei:   Pre Treatment: 0         Post Treatment: 0 Vitals        Oxygen    LAURIE Drain    RESTRICTIONS/PRECAUTIONS:  Restrictions/Precautions  Restrictions/Precautions: Fall Risk  Position Activity Restriction  Spinal Precautions: No Bending, No Twisting, No Lifting  Restrictions/Precautions: Fall Risk     MOBILITY: I Mod I S SBA CGA Min Mod Max Total  NT x2 Comments:   Bed Mobility    Rolling [] [] [] [] [] [] [] [] [] [x] []    Supine to Sit [] [] [] [] [] [] [] [] [] [x] []    Scooting [] [] [] [] [] [] [] [] [] [x] []    Sit to Supine [] [] [] [] [] [] [] [] [] [x] []    Transfers    Sit to Stand [] [] [] [x] [] [] [] [] [] [] []    Bed to Chair [] [] [] [x] [] [] [] [] [] [] []    Stand to Sit [] [] [] [x] [] [] [] [] [] [] []     [] [] [] [] [] [] [] [] [] [] []    I=Independent, Mod I=Modified Independent, S=Supervision, SBA=Standby Assistance, CGA=Contact Guard Assistance,   Min=Minimal Assistance, Mod=Moderate Assistance, Max=Maximal Assistance, Total=Total Assistance, NT=Not Tested    BALANCE: Good Fair+ Fair Fair- Poor NT Comments   Sitting Static [x] [] [] [] [] []    Sitting Dynamic [] [x] [] [] [] [] Standing Static [] [x] [] [] [] []    Standing Dynamic [] [x] [] [] [] []      GAIT: I Mod I S SBA CGA Min Mod Max Total  NT x2 Comments:   Level of Assistance [] [] [] [x] [] [] [] [] [] [] []    Distance 250  feet    DME Gait Belt and Rolling Walker    Gait Quality Decreased gely , Decreased step length, and Trunk sway increased    Weightbearing Status      Stairs      I=Independent, Mod I=Modified Independent, S=Supervision, SBA=Standby Assistance, CGA=Contact Guard Assistance,   Min=Minimal Assistance, Mod=Moderate Assistance, Max=Maximal Assistance, Total=Total Assistance, NT=Not Tested    PLAN:   FREQUENCY AND DURATION: BID for duration of hospital stay or until stated goals are met, whichever comes first.    TREATMENT:   TREATMENT:   Therapeutic Activity (23 Minutes): Therapeutic activity included Scooting, Ambulation on level ground, Sitting balance , Standing balance, and spinal precautions to improve functional Activity tolerance, Balance, Mobility, and Strength.     TREATMENT GRID:  N/A    AFTER TREATMENT PRECAUTIONS: Bed/Chair Locked, Call light within reach, Chair, Needs within reach, and RN notified    INTERDISCIPLINARY COLLABORATION:  RN/ PCT and PT/ PTA    EDUCATION:      TIME IN/OUT:  Time In: 0907  Time Out: 0930  Minutes: 23    AUGIE MENJIVAR PTA

## 2022-11-19 NOTE — PROGRESS NOTES
Patient noted to have an extensive red rash on her back and other parts of her body. She stated that she was not itching and di not want a benadryl. Francisco Flick that it sometimes happens because of what the sheets are washed in.

## 2022-11-19 NOTE — PROGRESS NOTES
Patient discharged home with family. Drain removed as ordered. Back dressing changed. Patient demonstrated understanding of spinal precautions as well as her medication regimen.

## 2022-11-19 NOTE — PROGRESS NOTES
2022         Post Op day: 3 Days Post-Op     Admit Date: 2022  Admit Diagnosis: Spondylolisthesis of lumbar region [M43.16]  Spinal stenosis of lumbar region with neurogenic claudication [M48.062]  S/P lumbar fusion [Z98.1]  No surgery found  No surgery found    Subjective: Doing well, No complaints, No SOB, No Chest Pain, No Nausea or Vomitting. Notes had pain yesterday but pain tablet resolved  PT/OT:                             Weight Bearing Status: WBAT with assistance device  BMP:  No results for input(s): CREA, BUN, NA, K, CL, CO2, AGAP, GLU in the last 72 hours. Patient Vitals for the past 8 hrs:   BP Temp Temp src Pulse Resp SpO2   22 0711 (!) 145/77 98.8 °F (37.1 °C) Oral 65 18 93 %   22 0522 (!) 139/57 99.3 °F (37.4 °C) Oral 62 17 93 %     Temp (24hrs), Av.5 °F (36.9 °C), Min:97.2 °F (36.2 °C), Max:99.3 °F (37.4 °C)    CBC:  No results for input(s): WBC, MONOS, HGB, HCT, PLT in the last 72 hours. Invalid input(s): GRANS, LYMPHS, EOS, ANEU, ABL,  PETER    Microbiology:   No results found for this or any previous visit (from the past 72 hour(s)). Objective: Vital Signs are Stable, No Acute Distress, Alert and Oriented  Dressing is Dry,  Neurovascular exam is normal.    Assessment:  Patient Active Problem List   Diagnosis    [address]anoma ([facility] [address]    Spondylolisthesis of lumbar region    Spinal stenosis of lumbar region with neurogenic claudication    S/P lumbar fusion       Plan: Continue Physical Therapy  Monitor Hbg and labs.    Dispo today  Pull drain     Signed By: LYNDA Pandya

## 2022-11-19 NOTE — CARE COORDINATION
Pt is for discharge home today with family. Referral has been called/faxed to [facility] for follow up home care as ordered. No additional CM orders received or supportive care needs expressed at this time. 11/19/22 [address] Discharge   Transition of Care Consult (CM Consult) Home Health;Discharge Planning   Internal Home Health No   Reason [address] used patient chose alternate agency; Physician referred to specific agency  ([facility])   [address] Provided? No   Mode of Transport at Discharge Other (see comment)  (family)   Confirm Follow Up Transport Family   Condition of Participation: Discharge Planning   The Plan for Transition of Care is related to the following treatment goals: Patient will participate in [facility] therapies in order to return to safe baseline independence in ADLs. The Patient and/or Patient Representative was provided with a Choice of Provider? Patient   The Patient and/Or Patient Representative agree with the Discharge Plan? Yes   Freedom of Choice list was provided with basic dialogue that supports the patient's individualized plan of care/goals, treatment preferences, and shares the quality data associated with the providers?   Yes

## 2022-11-28 ENCOUNTER — OFFICE VISIT (OUTPATIENT)
Dept: ORTHOPEDIC SURGERY | Age: 82
End: 2022-11-28

## 2022-11-28 DIAGNOSIS — Z98.1 STATUS POST LUMBAR SPINAL ARTHRODESIS: Primary | ICD-10-CM

## 2022-11-28 PROCEDURE — 99024 POSTOP FOLLOW-UP VISIT: CPT | Performed by: ORTHOPAEDIC SURGERY

## 2022-11-28 NOTE — PROGRESS NOTES
Name: Grant Lam  YOB: 1940  Gender: female  MRN: [id number]  Age: 81 y.o. Chief Complaint: Lumbar spine surgery follow up    History of Present Illness:      Grant Lam  is here for 2-week follow up of her  lumbar TLIF surgery. She reports significant relief of preoperative lower extremity pain, weakness and parasthesias. There is the expected residual back stiffness. Medications:       Current Outpatient Medications:     Methylcellulose, Laxative, (CITRUCEL PO), Take by mouth at bedtime, Disp: , Rfl:     Probiotic Product (PROBIOTIC DAILY PO), Take by mouth, Disp: , Rfl:     Cholecalciferol (VITAMIN D3) 50 MCG (2000 UT) CAPS, Take by mouth daily, Disp: , Rfl:     acetaminophen (TYLENOL) 500 MG tablet, Take 500 mg by mouth every 6 hours as needed, Disp: , Rfl:     citalopram (CELEXA) 20 MG tablet, TAKE 1.5 TABLETS (30 MG) BY MOUTH NIGHTLY, Disp: , Rfl:     Cyanocobalamin 1000 MCG SUBL, Place 500 mcg under the tongue every morning, Disp: , Rfl:     famotidine (PEPCID) 20 MG tablet, TAKE 1 TABLET BY MOUTH EVERY DAY IN THE EVENING, Disp: , Rfl:     ibuprofen (ADVIL;MOTRIN) 200 MG tablet, Take 400 mg by mouth as needed, Disp: , Rfl:     ketoconazole (NIZORAL) 2 % cream, APPLY TO AFFECTED AREAS ON THE FACE TWICE DAILY. , Disp: , Rfl:     levothyroxine (SYNTHROID) 50 MCG tablet, 75 mcg Daily, Disp: , Rfl:     lisinopril (PRINIVIL;ZESTRIL) 20 MG tablet, 20 mg daily, Disp: , Rfl:     lovastatin (MEVACOR) 10 MG tablet, nightly, Disp: , Rfl:     mometasone (ELOCON) 0.1 % cream, APPLY TWICE DAILY TO RASH X 2 WKS THEN AS NEEDED WHEN FLARED, Disp: , Rfl:     pantoprazole (PROTONIX) 40 MG tablet, TAKE 1 TABLET BY MOUTH EVERY MORNING., Disp: , Rfl:     polyethylene glycol (GLYCOLAX) 17 GM/SCOOP powder, Take by mouth as needed, Disp: , Rfl:     tamoxifen (NOLVADEX) 20 MG tablet, Take 20 mg by mouth daily, Disp: , Rfl:     Allergies:   Allergies   Allergen Reactions    Adhesive Tape Itching     Other reaction(s): Itching-Allergy, Rash-Allergy  Sensitive skin   Does well with paper tape    Thick white silk tape is one that bothers her  Denies Latex allergy      Levofloxacin Other (See Comments)     Other reaction(s): Headache-Intolerance  After I.V. Infusion, sudden hot flushing with pressure in the head. \"Flushing\"      Penicillins Rash     Other reaction(s): Rash-Allergy    Tixocortol      Other reaction(s): Hives/Swelling-Allergy, Itching-Allergy         Physical Exam:      Respirations are unlabored and there is no evidence of cyanosis      Wound is healing nicely without erythema, drainage or underlying fluctuance    Gait is  somewhat unsteady on a walker but improved from preop. Sensory testing reveals intact sensation to light touch and in the distribution of the L3-S1 dermatomes bilaterally. Strength testing in the lower extremity reveals the following based on the 5 point grading scale:       HF (L2) H Ab (L5) KE (L3/4) ADF (L4) EHL (L5) A Ev (S1) APF (S1)   Right 5 5 5 5 5 5 5   Left 5 5 5 5 5 5 5        Radiographic Studies:     X-rays including AP and lateral views of the lumbar spine were reviewed and interpreted:     Postoperative changes are noted status post lumbar fusion with instrumentation. The hardware appears to be well-placed and intact. There is no evidence of significant osteolysis. No significant adjacent level decompensation. Alignment is maintained. Radiographic Impression:    Appropriate postoperative changes status post lumbar laminectomy and fusion without evidence for hardware failure or decompensation. Diagnosis:      ICD-10-CM    1. Status post lumbar spinal arthrodesis  Z98.1           Assessment/Plan: This patient is following the expected course following the spine surgery. I encouraged her to walk as much as possible for exercise. She can also begin a stationary bike, or other low impact aerobic exercise.  We will limit lifting to 10 pounds for the next several weeks. Prolonged sitting should be avoided to minimize the strain on the lumbar area. Thereafter, activities will be gradually increased as tolerated. I will have her return for follow up in mid January at her 8-week postop.           Shahbaz Redding MD    11/28/22  12:25 PM

## 2023-01-09 ENCOUNTER — APPOINTMENT (RX ONLY)
Dept: URBAN - METROPOLITAN AREA CLINIC 330 | Facility: CLINIC | Age: 83
Setting detail: DERMATOLOGY
End: 2023-01-09

## 2023-01-09 DIAGNOSIS — D22 MELANOCYTIC NEVI: ICD-10-CM | Status: STABLE

## 2023-01-09 DIAGNOSIS — Z85.820 PERSONAL HISTORY OF MALIGNANT MELANOMA OF SKIN: ICD-10-CM

## 2023-01-09 DIAGNOSIS — L82.0 INFLAMED SEBORRHEIC KERATOSIS: ICD-10-CM

## 2023-01-09 DIAGNOSIS — L57.0 ACTINIC KERATOSIS: ICD-10-CM

## 2023-01-09 DIAGNOSIS — L21.8 OTHER SEBORRHEIC DERMATITIS: ICD-10-CM | Status: WELL CONTROLLED

## 2023-01-09 DIAGNOSIS — Z85.828 PERSONAL HISTORY OF OTHER MALIGNANT NEOPLASM OF SKIN: ICD-10-CM

## 2023-01-09 DIAGNOSIS — L82.1 OTHER SEBORRHEIC KERATOSIS: ICD-10-CM

## 2023-01-09 DIAGNOSIS — D18.0 HEMANGIOMA: ICD-10-CM

## 2023-01-09 DIAGNOSIS — L81.4 OTHER MELANIN HYPERPIGMENTATION: ICD-10-CM

## 2023-01-09 PROBLEM — D22.5 MELANOCYTIC NEVI OF TRUNK: Status: ACTIVE | Noted: 2023-01-09

## 2023-01-09 PROBLEM — D18.01 HEMANGIOMA OF SKIN AND SUBCUTANEOUS TISSUE: Status: ACTIVE | Noted: 2023-01-09

## 2023-01-09 PROCEDURE — 17003 DESTRUCT PREMALG LES 2-14: CPT | Mod: 59

## 2023-01-09 PROCEDURE — 99213 OFFICE O/P EST LOW 20 MIN: CPT | Mod: 25

## 2023-01-09 PROCEDURE — ? PRESCRIPTION MEDICATION MANAGEMENT

## 2023-01-09 PROCEDURE — 17000 DESTRUCT PREMALG LESION: CPT | Mod: 59

## 2023-01-09 PROCEDURE — ? LIQUID NITROGEN

## 2023-01-09 PROCEDURE — ? TREATMENT REGIMEN

## 2023-01-09 PROCEDURE — 17110 DESTRUCTION B9 LES UP TO 14: CPT

## 2023-01-09 PROCEDURE — ? MEDICAL PHOTOGRAPHY REVIEW

## 2023-01-09 PROCEDURE — ? COUNSELING

## 2023-01-09 PROCEDURE — ? FULL BODY SKIN EXAM

## 2023-01-09 PROCEDURE — ? ADDITIONAL NOTES

## 2023-01-09 ASSESSMENT — LOCATION DETAILED DESCRIPTION DERM
LOCATION DETAILED: MIDDLE STERNUM
LOCATION DETAILED: RIGHT SUPERIOR MEDIAL BUCCAL CHEEK
LOCATION DETAILED: LEFT INFERIOR UPPER BACK
LOCATION DETAILED: INFERIOR THORACIC SPINE
LOCATION DETAILED: RIGHT DISTAL POSTERIOR UPPER ARM
LOCATION DETAILED: LEFT CENTRAL LATERAL NECK
LOCATION DETAILED: LEFT DISTAL DORSAL FOREARM
LOCATION DETAILED: LEFT SUPERIOR MEDIAL FOREHEAD
LOCATION DETAILED: SUBMENTAL CHIN
LOCATION DETAILED: 2ND WEB SPACE LEFT HAND
LOCATION DETAILED: GLABELLA
LOCATION DETAILED: EPIGASTRIC SKIN

## 2023-01-09 ASSESSMENT — LOCATION SIMPLE DESCRIPTION DERM
LOCATION SIMPLE: UPPER BACK
LOCATION SIMPLE: RIGHT POSTERIOR UPPER ARM
LOCATION SIMPLE: GLABELLA
LOCATION SIMPLE: LEFT FOREHEAD
LOCATION SIMPLE: LEFT HAND
LOCATION SIMPLE: NECK
LOCATION SIMPLE: LEFT FOREARM
LOCATION SIMPLE: CHEST
LOCATION SIMPLE: SUBMENTAL CHIN
LOCATION SIMPLE: LEFT UPPER BACK
LOCATION SIMPLE: RIGHT CHEEK
LOCATION SIMPLE: ABDOMEN

## 2023-01-09 ASSESSMENT — LOCATION ZONE DERM
LOCATION ZONE: ARM
LOCATION ZONE: NECK
LOCATION ZONE: FACE
LOCATION ZONE: TRUNK
LOCATION ZONE: HAND

## 2023-01-09 ASSESSMENT — PAIN INTENSITY VAS: HOW INTENSE IS YOUR PAIN 0 BEING NO PAIN, 10 BEING THE MOST SEVERE PAIN POSSIBLE?: 1/10 PAIN

## 2023-01-09 NOTE — PROCEDURE: PRESCRIPTION MEDICATION MANAGEMENT
Render In Strict Bullet Format?: No
Continue Regimen: Mometasone oint bid for two weeks\\n Ketoconazole cream twice daily for two weeks then 1-2x weekly for maintenance
Detail Level: Zone
Plan: Levulan (Blue light) on forehead.

## 2023-01-09 NOTE — PROCEDURE: LIQUID NITROGEN
Detail Level: Detailed
Render Post-Care Instructions In Note?: no
Post-Care Instructions: I reviewed with the patient in detail post-care instructions. Patient is to wear sunprotection, and avoid picking at any of the treated lesions. Pt may apply Vaseline to crusted or scabbing areas.
Duration Of Freeze Thaw-Cycle (Seconds): 0
Consent: The patient's consent was obtained including but not limited to risks of crusting, scabbing, blistering, scarring, darker or lighter pigmentary change, recurrence, incomplete removal and infection.
Show Aperture Variable?: Yes
Medical Necessity Information: It is in your best interest to select a reason for this procedure from the list below. All of these items fulfill various CMS LCD requirements except the new and changing color options.
Spray Paint Text: The liquid nitrogen was applied to the skin utilizing a spray paint frosting technique.
Medical Necessity Clause: This procedure was medically necessary because the lesions that were treated were:

## 2023-02-02 ENCOUNTER — OFFICE VISIT (OUTPATIENT)
Dept: ORTHOPEDIC SURGERY | Age: 83
End: 2023-02-02

## 2023-02-02 DIAGNOSIS — Z98.1 STATUS POST LUMBAR SPINAL ARTHRODESIS: Primary | ICD-10-CM

## 2023-02-02 PROCEDURE — 99024 POSTOP FOLLOW-UP VISIT: CPT | Performed by: ORTHOPAEDIC SURGERY

## 2023-02-02 NOTE — PROGRESS NOTES
Name: Hunter Fuller  YOB: 1940  Gender: female  MRN: [id number]  Age: 81 y.o. Chief Complaint: Lumbar spine surgery follow up    History of Present Illness:      Hunter Fuller  is here for 2.5-month follow up of her  lumbar TLIF surgery. She reports significant relief of preoperative lower extremity pain, weakness and parasthesias. There is the expected residual back stiffness. Medications:       Current Outpatient Medications:     Methylcellulose, Laxative, (CITRUCEL PO), Take by mouth at bedtime, Disp: , Rfl:     Probiotic Product (PROBIOTIC DAILY PO), Take by mouth, Disp: , Rfl:     Cholecalciferol (VITAMIN D3) 50 MCG (2000 UT) CAPS, Take by mouth daily, Disp: , Rfl:     acetaminophen (TYLENOL) 500 MG tablet, Take 500 mg by mouth every 6 hours as needed, Disp: , Rfl:     citalopram (CELEXA) 20 MG tablet, TAKE 1.5 TABLETS (30 MG) BY MOUTH NIGHTLY, Disp: , Rfl:     Cyanocobalamin 1000 MCG SUBL, Place 500 mcg under the tongue every morning, Disp: , Rfl:     famotidine (PEPCID) 20 MG tablet, TAKE 1 TABLET BY MOUTH EVERY DAY IN THE EVENING, Disp: , Rfl:     ibuprofen (ADVIL;MOTRIN) 200 MG tablet, Take 400 mg by mouth as needed, Disp: , Rfl:     ketoconazole (NIZORAL) 2 % cream, APPLY TO AFFECTED AREAS ON THE FACE TWICE DAILY. , Disp: , Rfl:     levothyroxine (SYNTHROID) 50 MCG tablet, 75 mcg Daily, Disp: , Rfl:     lisinopril (PRINIVIL;ZESTRIL) 20 MG tablet, 20 mg daily, Disp: , Rfl:     lovastatin (MEVACOR) 10 MG tablet, nightly, Disp: , Rfl:     mometasone (ELOCON) 0.1 % cream, APPLY TWICE DAILY TO RASH X 2 WKS THEN AS NEEDED WHEN FLARED, Disp: , Rfl:     pantoprazole (PROTONIX) 40 MG tablet, TAKE 1 TABLET BY MOUTH EVERY MORNING., Disp: , Rfl:     polyethylene glycol (GLYCOLAX) 17 GM/SCOOP powder, Take by mouth as needed, Disp: , Rfl:     tamoxifen (NOLVADEX) 20 MG tablet, Take 20 mg by mouth daily, Disp: , Rfl:     Allergies:   Allergies   Allergen Reactions    Adhesive Tape Itching     Other reaction(s): Itching-Allergy, Rash-Allergy  Sensitive skin   Does well with paper tape    Thick white silk tape is one that bothers her  Denies Latex allergy      Levofloxacin Other (See Comments)     Other reaction(s): Headache-Intolerance  After I.V. Infusion, sudden hot flushing with pressure in the head. \"Flushing\"      Penicillins Rash     Other reaction(s): Rash-Allergy    Tixocortol      Other reaction(s): Hives/Swelling-Allergy, Itching-Allergy         Physical Exam:      Respirations are unlabored and there is no evidence of cyanosis      Wound is healed nicely without erythema, drainage or underlying fluctuance    Gait is improved    Sensory testing reveals intact sensation to light touch and in the distribution of the L3-S1 dermatomes bilaterally. Strength testing in the lower extremity reveals the following based on the 5 point grading scale:       HF (L2) H Ab (L5) KE (L3/4) ADF (L4) EHL (L5) A Ev (S1) APF (S1)   Right 5 5 5 5 5 5 5   Left 5 5 5 5 5 5 5        Radiographic Studies:     X-rays including AP and lateral views of the lumbar spine were reviewed and interpreted:     Postoperative changes are noted status post lumbar fusion with instrumentation. The hardware appears to be well-placed and intact. There is no evidence of significant osteolysis. No significant adjacent level decompensation. Alignment is maintained. Radiographic Impression:    Appropriate postoperative changes status post lumbar laminectomy and fusion without evidence for hardware failure or decompensation. Diagnosis:      ICD-10-CM    1. Status post lumbar spinal arthrodesis  Z98.1           Assessment/Plan: This patient is following the expected course following the spine surgery. She can continue to gradually increase activities as tolerated. I will have her return for follow up in 4 months.            Chung Ramon MD    02/02/23  12:54 PM

## 2023-02-20 ENCOUNTER — APPOINTMENT (RX ONLY)
Dept: URBAN - METROPOLITAN AREA CLINIC 330 | Facility: CLINIC | Age: 83
Setting detail: DERMATOLOGY
End: 2023-02-20

## 2023-02-20 DIAGNOSIS — L57.0 ACTINIC KERATOSIS: ICD-10-CM

## 2023-02-20 DIAGNOSIS — L23.9 ALLERGIC CONTACT DERMATITIS, UNSPECIFIED CAUSE: ICD-10-CM

## 2023-02-20 PROCEDURE — ? PRESCRIPTION MEDICATION MANAGEMENT

## 2023-02-20 PROCEDURE — ? PRESCRIPTION

## 2023-02-20 PROCEDURE — 99213 OFFICE O/P EST LOW 20 MIN: CPT

## 2023-02-20 PROCEDURE — ? DEFER

## 2023-02-20 PROCEDURE — ? COUNSELING

## 2023-02-20 PROCEDURE — ? FULL BODY SKIN EXAM - DECLINED

## 2023-02-20 RX ORDER — MOMETASONE FUROATE 1 MG/G
OINTMENT TOPICAL
Qty: 45 | Refills: 1 | Status: ERX

## 2023-02-20 ASSESSMENT — LOCATION SIMPLE DESCRIPTION DERM
LOCATION SIMPLE: RIGHT CHEEK
LOCATION SIMPLE: INFERIOR FOREHEAD
LOCATION SIMPLE: LEFT FOREHEAD

## 2023-02-20 ASSESSMENT — LOCATION DETAILED DESCRIPTION DERM
LOCATION DETAILED: RIGHT CENTRAL MALAR CHEEK
LOCATION DETAILED: INFERIOR MID FOREHEAD
LOCATION DETAILED: LEFT SUPERIOR MEDIAL FOREHEAD

## 2023-02-20 ASSESSMENT — LOCATION ZONE DERM: LOCATION ZONE: FACE

## 2023-02-20 NOTE — PROCEDURE: DEFER
Instructions (Optional): Need to get rash clear on face before treating
Introduction Text (Please End With A Colon): The following procedure was deferred:
Size Of Lesion In Cm (Optional): 0
Detail Level: Detailed
Procedure To Be Performed At Next Visit: PDT Blue Light

## 2023-02-20 NOTE — PROCEDURE: PRESCRIPTION MEDICATION MANAGEMENT
Render In Strict Bullet Format?: No
Initiate Treatment: Mometasone oint bid to face x2wks
Detail Level: Zone

## 2023-02-20 NOTE — PROCEDURE: MIPS QUALITY
Quality 130: Documentation Of Current Medications In The Medical Record: Current Medications Documented
Quality 137: Melanoma: Continuity Of Care - Recall System: Patient information entered into a recall system that includes: target date for the next exam specified AND a process to follow up with patients regarding missed or unscheduled appointments
Quality 138: Melanoma: Coordination Of Care: A treatment plan was communicated to the physicians providing continuing care within one month of diagnosis outlining: diagnosis, tumor thickness and a plan for surgery or alternate care.
Quality 226: Preventive Care And Screening: Tobacco Use: Screening And Cessation Intervention: Tobacco Screening not Performed for Unknown Reasons
Detail Level: Detailed
Quality 110: Preventive Care And Screening: Influenza Immunization: Influenza Immunization previously received during influenza season
Quality 431: Preventive Care And Screening: Unhealthy Alcohol Use - Screening: Patient not identified as an unhealthy alcohol user when screened for unhealthy alcohol use using a systematic screening method

## 2023-03-06 ENCOUNTER — APPOINTMENT (RX ONLY)
Dept: URBAN - METROPOLITAN AREA CLINIC 330 | Facility: CLINIC | Age: 83
Setting detail: DERMATOLOGY
End: 2023-03-06

## 2023-03-06 DIAGNOSIS — L57.0 ACTINIC KERATOSIS: ICD-10-CM

## 2023-03-06 PROCEDURE — ? ADDITIONAL NOTES

## 2023-03-06 PROCEDURE — 99213 OFFICE O/P EST LOW 20 MIN: CPT

## 2023-03-06 PROCEDURE — ? COUNSELING

## 2023-03-06 ASSESSMENT — LOCATION SIMPLE DESCRIPTION DERM: LOCATION SIMPLE: LEFT FOREHEAD

## 2023-03-06 ASSESSMENT — LOCATION ZONE DERM: LOCATION ZONE: FACE

## 2023-03-06 ASSESSMENT — LOCATION DETAILED DESCRIPTION DERM: LOCATION DETAILED: LEFT SUPERIOR MEDIAL FOREHEAD

## 2023-03-06 NOTE — PROCEDURE: ADDITIONAL NOTES
Additional Notes: Clinically nothing present today for light treatment today. Pt comfortable with postponing treatment until needed.
Render Risk Assessment In Note?: no
Detail Level: Simple

## 2023-07-27 ENCOUNTER — OFFICE VISIT (OUTPATIENT)
Dept: ORTHOPEDIC SURGERY | Age: 83
End: 2023-07-27

## 2023-07-27 DIAGNOSIS — Z98.1 STATUS POST LUMBAR SPINAL ARTHRODESIS: Primary | ICD-10-CM

## 2023-07-27 NOTE — PROGRESS NOTES
Name: Beth Mehta  YOB: 1940  Gender: female  MRN: [id number]  Age: 81 y.o. Chief Complaint: Lumbar spine surgery follow up    History of Present Illness:      Beth Mehta  is here for 9-month follow up of her  lumbar TLIF surgery. She reports significant relief of preoperative lower extremity pain, weakness and parasthesias. There is the expected residual back stiffness. Physical Exam:      Respirations are unlabored and there is no evidence of cyanosis     Gait is  quite unsteady but baseline for her    There is no focal motor deficit       Radiographic Studies:     X-rays including AP and lateral views of the lumbar spine were reviewed and interpreted:     Postoperative changes are noted status post lumbar fusion with instrumentation. The hardware appears to be well-placed and intact. There is no evidence of significant osteolysis. No significant adjacent level decompensation. Alignment is maintained. Radiographic Impression:    Appropriate postoperative changes status post lumbar laminectomy and fusion without evidence for hardware failure or decompensation. Diagnosis:      ICD-10-CM    1. Status post lumbar spinal arthrodesis  Z98.1           Assessment/Plan: This patient is following the expected course following the spine surgery. She can continue to gradually increase activities as tolerated. I will have her return for follow up as needed.            Oskar Krueger MD    07/27/23  12:57 PM

## 2023-09-18 ENCOUNTER — APPOINTMENT (RX ONLY)
Dept: URBAN - METROPOLITAN AREA CLINIC 329 | Facility: CLINIC | Age: 83
Setting detail: DERMATOLOGY
End: 2023-09-18

## 2023-09-18 DIAGNOSIS — L81.4 OTHER MELANIN HYPERPIGMENTATION: ICD-10-CM

## 2023-09-18 DIAGNOSIS — Z85.820 PERSONAL HISTORY OF MALIGNANT MELANOMA OF SKIN: ICD-10-CM

## 2023-09-18 DIAGNOSIS — L20.89 OTHER ATOPIC DERMATITIS: ICD-10-CM

## 2023-09-18 DIAGNOSIS — L82.1 OTHER SEBORRHEIC KERATOSIS: ICD-10-CM

## 2023-09-18 DIAGNOSIS — Z85.828 PERSONAL HISTORY OF OTHER MALIGNANT NEOPLASM OF SKIN: ICD-10-CM

## 2023-09-18 DIAGNOSIS — D18.0 HEMANGIOMA: ICD-10-CM

## 2023-09-18 DIAGNOSIS — D22 MELANOCYTIC NEVI: ICD-10-CM

## 2023-09-18 DIAGNOSIS — Z86.007 PERSONAL HISTORY OF IN-SITU NEOPLASM OF SKIN: ICD-10-CM

## 2023-09-18 PROBLEM — D18.01 HEMANGIOMA OF SKIN AND SUBCUTANEOUS TISSUE: Status: ACTIVE | Noted: 2023-09-18

## 2023-09-18 PROBLEM — D22.5 MELANOCYTIC NEVI OF TRUNK: Status: ACTIVE | Noted: 2023-09-18

## 2023-09-18 PROCEDURE — 99213 OFFICE O/P EST LOW 20 MIN: CPT

## 2023-09-18 PROCEDURE — ? FULL BODY SKIN EXAM

## 2023-09-18 PROCEDURE — ? TREATMENT REGIMEN

## 2023-09-18 PROCEDURE — ? COUNSELING

## 2023-09-18 ASSESSMENT — LOCATION SIMPLE DESCRIPTION DERM
LOCATION SIMPLE: LEFT UPPER BACK
LOCATION SIMPLE: ABDOMEN
LOCATION SIMPLE: RIGHT UPPER BACK
LOCATION SIMPLE: SUPERIOR FOREHEAD
LOCATION SIMPLE: RIGHT SHOULDER
LOCATION SIMPLE: LEFT FOREARM
LOCATION SIMPLE: LEFT HAND
LOCATION SIMPLE: UPPER BACK
LOCATION SIMPLE: CHEST

## 2023-09-18 ASSESSMENT — LOCATION DETAILED DESCRIPTION DERM
LOCATION DETAILED: RIGHT POSTERIOR SHOULDER
LOCATION DETAILED: RIGHT SUPERIOR UPPER BACK
LOCATION DETAILED: LEFT MEDIAL UPPER BACK
LOCATION DETAILED: EPIGASTRIC SKIN
LOCATION DETAILED: SUPERIOR MID FOREHEAD
LOCATION DETAILED: INFERIOR THORACIC SPINE
LOCATION DETAILED: LEFT SUPERIOR UPPER BACK
LOCATION DETAILED: STERNAL NOTCH
LOCATION DETAILED: RIGHT ANTERIOR SHOULDER
LOCATION DETAILED: RIGHT MEDIAL SUPERIOR CHEST
LOCATION DETAILED: LEFT SUPERIOR MEDIAL UPPER BACK
LOCATION DETAILED: SUPERIOR THORACIC SPINE
LOCATION DETAILED: 2ND WEB SPACE LEFT HAND
LOCATION DETAILED: LEFT DISTAL DORSAL FOREARM

## 2023-09-18 ASSESSMENT — LOCATION ZONE DERM
LOCATION ZONE: FACE
LOCATION ZONE: ARM
LOCATION ZONE: HAND
LOCATION ZONE: TRUNK

## 2023-09-18 NOTE — PROCEDURE: MIPS QUALITY
Quality 138: Melanoma: Coordination Of Care: A treatment plan was communicated to the physicians providing continuing care within one month of diagnosis outlining: diagnosis, tumor thickness and a plan for surgery or alternate care.
Quality 137: Melanoma: Continuity Of Care - Recall System: Patient information entered into a recall system that includes: target date for the next exam specified AND a process to follow up with patients regarding missed or unscheduled appointments
Quality 130: Documentation Of Current Medications In The Medical Record: Current Medications Documented
Detail Level: Detailed
Quality 431: Preventive Care And Screening: Unhealthy Alcohol Use - Screening: Patient not identified as an unhealthy alcohol user when screened for unhealthy alcohol use using a systematic screening method
Quality 226: Preventive Care And Screening: Tobacco Use: Screening And Cessation Intervention: Patient screened for tobacco use and is an ex/non-smoker
No

## 2024-03-25 ENCOUNTER — APPOINTMENT (RX ONLY)
Dept: URBAN - METROPOLITAN AREA CLINIC 329 | Facility: CLINIC | Age: 84
Setting detail: DERMATOLOGY
End: 2024-03-25

## 2024-03-25 DIAGNOSIS — Z85.820 PERSONAL HISTORY OF MALIGNANT MELANOMA OF SKIN: ICD-10-CM

## 2024-03-25 DIAGNOSIS — L81.4 OTHER MELANIN HYPERPIGMENTATION: ICD-10-CM

## 2024-03-25 DIAGNOSIS — D485 NEOPLASM OF UNCERTAIN BEHAVIOR OF SKIN: ICD-10-CM

## 2024-03-25 PROBLEM — D48.5 NEOPLASM OF UNCERTAIN BEHAVIOR OF SKIN: Status: ACTIVE | Noted: 2024-03-25

## 2024-03-25 PROCEDURE — 99212 OFFICE O/P EST SF 10 MIN: CPT | Mod: 25

## 2024-03-25 PROCEDURE — ? COUNSELING

## 2024-03-25 PROCEDURE — ? SUNSCREEN RECOMMENDATIONS

## 2024-03-25 PROCEDURE — 11102 TANGNTL BX SKIN SINGLE LES: CPT

## 2024-03-25 PROCEDURE — ? OTHER

## 2024-03-25 PROCEDURE — ? TREATMENT REGIMEN

## 2024-03-25 PROCEDURE — ? FULL BODY SKIN EXAM - DECLINED

## 2024-03-25 PROCEDURE — ? BIOPSY BY SHAVE METHOD

## 2024-03-25 ASSESSMENT — LOCATION DETAILED DESCRIPTION DERM
LOCATION DETAILED: INFERIOR THORACIC SPINE
LOCATION DETAILED: LEFT SUPERIOR ANTERIOR NECK
LOCATION DETAILED: LEFT INFERIOR CENTRAL MALAR CHEEK
LOCATION DETAILED: LEFT SUPERIOR LATERAL UPPER BACK

## 2024-03-25 ASSESSMENT — LOCATION SIMPLE DESCRIPTION DERM
LOCATION SIMPLE: LEFT ANTERIOR NECK
LOCATION SIMPLE: LEFT CHEEK
LOCATION SIMPLE: UPPER BACK
LOCATION SIMPLE: LEFT UPPER BACK

## 2024-03-25 ASSESSMENT — LOCATION ZONE DERM
LOCATION ZONE: NECK
LOCATION ZONE: TRUNK
LOCATION ZONE: FACE

## 2024-03-25 NOTE — PROCEDURE: TREATMENT REGIMEN
The patient has been re-examined and I agree with the above assessment or I updated with my findings. Detail Level: Zone

## 2024-03-25 NOTE — PROCEDURE: OTHER
Detail Level: Zone
Render Risk Assessment In Note?: no
Note Text (......Xxx Chief Complaint.): This diagnosis correlates with the
Other (Free Text): Patient is moving with son in 3 weeks and will no longer be in town. Will call patient with Biopsy results and treat if needed  also will  assist with having her records sent to her new dermatologist when she finds one around her new location

## (undated) DEVICE — DRILL BIT: Brand: XIA 4.5 SYSTEM -  XIA CT

## (undated) DEVICE — POSTERIOR LAMI VANPLT-LUCAS: Brand: MEDLINE INDUSTRIES, INC.

## (undated) DEVICE — SUTURE VCRL SZ 2-0 L27IN ABSRB UD L36MM CP-1 1/2 CIR REV J266H

## (undated) DEVICE — SYSTEM SKIN CLSR 22CM DERMBND PRINEO

## (undated) DEVICE — SOLUTION IRRIG 1000ML 09% SOD CHL USP PIC PLAS CONTAINER

## (undated) DEVICE — SUTURE VCRL SZ 1 L27IN ABSRB UD L36MM CP-1 1/2 CIR REV CUT J268H

## (undated) DEVICE — ABSORBENT, WATERPROOF, BACTERIA PROOF FILM DRESSING: Brand: OPSITE POST OP 9.5X8.5CM CTN 20

## (undated) DEVICE — BIPOLAR SEALER 23-112-1 AQM 6.0: Brand: AQUAMANTYS ®

## (undated) DEVICE — MODULAR TAP: Brand: XIA 4.5 SYSTEM -  XIA CT

## (undated) DEVICE — TRAY,URINE METER,100% SILICONE,16FR10ML: Brand: MEDLINE

## (undated) DEVICE — DRAIN SURG 10FR L1/8IN RND CHN FULL FLUT HEAT POLISHED PERF

## (undated) DEVICE — AGENT HEMOSTATIC SURGIFLOW MATRIX KIT W/THROMBIN

## (undated) DEVICE — STERILE PACKAGE WITH CANNULA: Brand: LITE BIO DELIVERY SYSTEM

## (undated) DEVICE — RESERVOIR,SUCTION,100CC,SILICONE: Brand: MEDLINE

## (undated) DEVICE — ABSORBENT, WATERPROOF, BACTERIA PROOF FILM DRESSING: Brand: OPSITE POST OP 20X10CM CTN 20